# Patient Record
Sex: MALE | Race: WHITE | NOT HISPANIC OR LATINO | Employment: OTHER | URBAN - METROPOLITAN AREA
[De-identification: names, ages, dates, MRNs, and addresses within clinical notes are randomized per-mention and may not be internally consistent; named-entity substitution may affect disease eponyms.]

---

## 2023-03-21 LAB
BASOPHILS (10*3/UL) IN BLOOD BY AUTOMATED COUNT: NORMAL
BASOPHILS/100 LEUKOCYTES IN BLOOD BY AUTOMATED COUNT: NORMAL
EOSINOPHILS (10*3/UL) IN BLOOD BY AUTOMATED COUNT: NORMAL
EOSINOPHILS/100 LEUKOCYTES IN BLOOD BY AUTOMATED COUNT: NORMAL
ERYTHROCYTE DISTRIBUTION WIDTH (RATIO) BY AUTOMATED COUNT: NORMAL
ERYTHROCYTE MEAN CORPUSCULAR HEMOGLOBIN CONCENTRATION (G/DL) BY AUTOMATED: NORMAL
ERYTHROCYTE MEAN CORPUSCULAR VOLUME (FL) BY AUTOMATED COUNT: NORMAL
ERYTHROCYTES (10*6/UL) IN BLOOD BY AUTOMATED COUNT: NORMAL
HEMATOCRIT (%) IN BLOOD BY AUTOMATED COUNT: NORMAL
HEMOGLOBIN (G/DL) IN BLOOD: NORMAL
IMMATURE GRANULOCYTES/100 LEUKOCYTES IN BLOOD BY AUTOMATED COUNT: NORMAL
LEUKOCYTES (10*3/UL) IN BLOOD BY AUTOMATED COUNT: NORMAL
LYMPHOCYTES (10*3/UL) IN BLOOD BY AUTOMATED COUNT: NORMAL
LYMPHOCYTES/100 LEUKOCYTES IN BLOOD BY AUTOMATED COUNT: NORMAL
MANUAL DIFFERENTIAL Y/N: NORMAL
MONOCYTES (10*3/UL) IN BLOOD BY AUTOMATED COUNT: NORMAL
MONOCYTES/100 LEUKOCYTES IN BLOOD BY AUTOMATED COUNT: NORMAL
NEUTROPHILS (10*3/UL) IN BLOOD BY AUTOMATED COUNT: NORMAL
NEUTROPHILS/100 LEUKOCYTES IN BLOOD BY AUTOMATED COUNT: NORMAL
NRBC (PER 100 WBCS) BY AUTOMATED COUNT: NORMAL
PLATELETS (10*3/UL) IN BLOOD AUTOMATED COUNT: NORMAL

## 2023-08-14 ENCOUNTER — HOSPITAL ENCOUNTER (OUTPATIENT)
Dept: DATA CONVERSION | Facility: HOSPITAL | Age: 88
Discharge: HOME | End: 2023-08-14

## 2023-08-14 DIAGNOSIS — E11.9 TYPE 2 DIABETES MELLITUS WITHOUT COMPLICATIONS (MULTI): ICD-10-CM

## 2023-08-14 DIAGNOSIS — D58.9 HEREDITARY HEMOLYTIC ANEMIA, UNSPECIFIED (CMS-HCC): ICD-10-CM

## 2023-08-14 DIAGNOSIS — I10 ESSENTIAL (PRIMARY) HYPERTENSION: ICD-10-CM

## 2023-08-14 LAB
ALBUMIN SERPL-MCNC: 4.3 GM/DL (ref 3.5–5)
ALBUMIN/GLOB SERPL: 2 RATIO (ref 1.5–3)
ALP BLD-CCNC: 69 U/L (ref 35–125)
ALT SERPL-CCNC: 14 U/L (ref 5–40)
ANION GAP SERPL CALCULATED.3IONS-SCNC: 12 MMOL/L (ref 0–19)
AST SERPL-CCNC: 14 U/L (ref 5–40)
BILIRUB SERPL-MCNC: 0.6 MG/DL (ref 0.1–1.2)
BUN SERPL-MCNC: 16 MG/DL (ref 8–25)
BUN/CREAT SERPL: 16 RATIO (ref 8–21)
CALCIUM SERPL-MCNC: 9.4 MG/DL (ref 8.5–10.4)
CHLORIDE SERPL-SCNC: 102 MMOL/L (ref 97–107)
CO2 SERPL-SCNC: 28 MMOL/L (ref 24–31)
CREAT SERPL-MCNC: 1 MG/DL (ref 0.4–1.6)
DEPRECATED RDW RBC AUTO: 47.6 FL (ref 37–54)
ERYTHROCYTE [DISTWIDTH] IN BLOOD BY AUTOMATED COUNT: 13.9 % (ref 11.7–15)
GFR SERPL CREATININE-BSD FRML MDRD: 73 ML/MIN/1.73 M2
GLOBULIN SER-MCNC: 2.1 G/DL (ref 1.9–3.7)
GLUCOSE SERPL-MCNC: 103 MG/DL (ref 65–99)
HBA1C MFR BLD: 6.4 % (ref 4–6)
HCT VFR BLD AUTO: 48.7 % (ref 41–50)
HGB BLD-MCNC: 16.7 GM/DL (ref 13.5–16.5)
MCH RBC QN AUTO: 32.1 PG (ref 26–34)
MCHC RBC AUTO-ENTMCNC: 34.3 % (ref 31–37)
MCV RBC AUTO: 93.5 FL (ref 80–100)
NRBC BLD-RTO: 0 /100 WBC
PLATELET # BLD AUTO: 224 K/UL (ref 150–450)
PMV BLD AUTO: 10.7 CU (ref 7–12.6)
POTASSIUM SERPL-SCNC: 4.4 MMOL/L (ref 3.4–5.1)
PROT SERPL-MCNC: 6.4 G/DL (ref 5.9–7.9)
RBC # BLD AUTO: 5.21 M/UL (ref 4.5–5.5)
SODIUM SERPL-SCNC: 142 MMOL/L (ref 133–145)
WBC # BLD AUTO: 10 K/UL (ref 4.5–11)

## 2023-09-14 PROBLEM — R26.9 GAIT ABNORMALITY: Status: ACTIVE | Noted: 2023-09-14

## 2023-09-14 PROBLEM — M54.40 BACK PAIN OF LUMBAR REGION WITH SCIATICA: Status: ACTIVE | Noted: 2023-09-14

## 2023-09-14 PROBLEM — R11.2 NAUSEA & VOMITING: Status: ACTIVE | Noted: 2023-09-14

## 2023-09-14 PROBLEM — G62.9 NEUROPATHY: Status: ACTIVE | Noted: 2023-09-14

## 2023-09-14 PROBLEM — R31.9 HEMATURIA SYNDROME: Status: ACTIVE | Noted: 2023-09-14

## 2023-09-14 PROBLEM — M10.9 GOUT: Status: ACTIVE | Noted: 2023-09-14

## 2023-09-14 PROBLEM — M47.816 LUMBAR SPONDYLOSIS: Status: ACTIVE | Noted: 2023-09-14

## 2023-09-14 PROBLEM — E11.9 TYPE 2 DIABETES MELLITUS WITHOUT COMPLICATION, WITHOUT LONG-TERM CURRENT USE OF INSULIN (MULTI): Status: ACTIVE | Noted: 2023-09-14

## 2023-09-14 PROBLEM — J11.1 INFLUENZA-LIKE ILLNESS: Status: ACTIVE | Noted: 2023-09-14

## 2023-09-14 PROBLEM — N20.0 KIDNEY STONE: Status: ACTIVE | Noted: 2023-09-14

## 2023-09-14 PROBLEM — R53.82 CHRONIC FATIGUE: Status: ACTIVE | Noted: 2023-09-14

## 2023-09-14 PROBLEM — F41.9 ANXIETY: Status: ACTIVE | Noted: 2023-09-14

## 2023-09-14 PROBLEM — G89.29 OTHER CHRONIC PAIN: Status: ACTIVE | Noted: 2023-09-14

## 2023-09-14 PROBLEM — D59.10 AUTOIMMUNE HEMOLYTIC ANEMIA (MULTI): Status: ACTIVE | Noted: 2023-09-14

## 2023-09-14 PROBLEM — N40.0 BENIGN PROSTATIC HYPERPLASIA: Status: ACTIVE | Noted: 2023-09-14

## 2023-09-14 PROBLEM — I10 HYPERTENSION: Status: ACTIVE | Noted: 2023-09-14

## 2023-09-14 PROBLEM — K21.9 GASTROESOPHAGEAL REFLUX DISEASE WITHOUT ESOPHAGITIS: Status: ACTIVE | Noted: 2023-09-14

## 2023-09-14 PROBLEM — R10.9 ABDOMINAL PAIN: Status: ACTIVE | Noted: 2023-09-14

## 2023-09-14 PROBLEM — E78.5 HYPERLIPIDEMIA: Status: ACTIVE | Noted: 2023-09-14

## 2023-09-14 PROBLEM — N32.81 OVERACTIVE BLADDER: Status: ACTIVE | Noted: 2023-09-14

## 2023-09-14 PROBLEM — F32.A DEPRESSION: Status: ACTIVE | Noted: 2023-09-14

## 2023-09-14 PROBLEM — M19.90 OSTEOARTHRITIS: Status: ACTIVE | Noted: 2023-09-14

## 2023-09-14 RX ORDER — AMLODIPINE BESYLATE 5 MG/1
1 TABLET ORAL DAILY
COMMUNITY
Start: 2015-09-09

## 2023-09-14 RX ORDER — LISINOPRIL 20 MG/1
1 TABLET ORAL 2 TIMES DAILY
COMMUNITY
End: 2024-04-15

## 2023-09-14 RX ORDER — TRAZODONE HYDROCHLORIDE 50 MG/1
1 TABLET ORAL NIGHTLY
COMMUNITY
End: 2024-04-15

## 2023-09-14 RX ORDER — MAGNESIUM OXIDE/MAG AA CHELATE 300 MG
1 CAPSULE ORAL DAILY
COMMUNITY

## 2023-09-14 RX ORDER — ALLOPURINOL 100 MG/1
2 TABLET ORAL DAILY
COMMUNITY
End: 2023-12-15 | Stop reason: ALTCHOICE

## 2023-09-14 RX ORDER — FINASTERIDE 5 MG/1
1 TABLET, FILM COATED ORAL DAILY
COMMUNITY
Start: 2023-09-07 | End: 2024-09-01

## 2023-09-14 RX ORDER — ZINC GLUCONATE 50 MG
1 TABLET ORAL DAILY
COMMUNITY

## 2023-09-14 RX ORDER — ASCORBIC ACID 500 MG
TABLET ORAL
COMMUNITY

## 2023-12-01 ENCOUNTER — OFFICE VISIT (OUTPATIENT)
Dept: PRIMARY CARE | Facility: CLINIC | Age: 88
End: 2023-12-01
Payer: MEDICARE

## 2023-12-01 VITALS
HEART RATE: 97 BPM | WEIGHT: 197 LBS | OXYGEN SATURATION: 95 % | DIASTOLIC BLOOD PRESSURE: 74 MMHG | BODY MASS INDEX: 28.47 KG/M2 | SYSTOLIC BLOOD PRESSURE: 138 MMHG | TEMPERATURE: 98.4 F

## 2023-12-01 DIAGNOSIS — R21 RASH AND NONSPECIFIC SKIN ERUPTION: Primary | ICD-10-CM

## 2023-12-01 PROCEDURE — 3078F DIAST BP <80 MM HG: CPT | Performed by: INTERNAL MEDICINE

## 2023-12-01 PROCEDURE — 1125F AMNT PAIN NOTED PAIN PRSNT: CPT | Performed by: INTERNAL MEDICINE

## 2023-12-01 PROCEDURE — 1036F TOBACCO NON-USER: CPT | Performed by: INTERNAL MEDICINE

## 2023-12-01 PROCEDURE — 3075F SYST BP GE 130 - 139MM HG: CPT | Performed by: INTERNAL MEDICINE

## 2023-12-01 PROCEDURE — 99214 OFFICE O/P EST MOD 30 MIN: CPT | Mod: 25 | Performed by: INTERNAL MEDICINE

## 2023-12-01 PROCEDURE — 1159F MED LIST DOCD IN RCRD: CPT | Performed by: INTERNAL MEDICINE

## 2023-12-01 PROCEDURE — 99214 OFFICE O/P EST MOD 30 MIN: CPT | Performed by: INTERNAL MEDICINE

## 2023-12-01 RX ORDER — METHYLPREDNISOLONE 4 MG/1
TABLET ORAL
Qty: 21 TABLET | Refills: 0 | Status: SHIPPED | OUTPATIENT
Start: 2023-12-01 | End: 2023-12-08

## 2023-12-01 ASSESSMENT — ENCOUNTER SYMPTOMS
COLOR CHANGE: 1
FEVER: 0
OCCASIONAL FEELINGS OF UNSTEADINESS: 0
CHILLS: 0
FATIGUE: 0
MYALGIAS: 0
JOINT SWELLING: 0
GASTROINTESTINAL NEGATIVE: 1
DEPRESSION: 0
LOSS OF SENSATION IN FEET: 0

## 2023-12-01 ASSESSMENT — PATIENT HEALTH QUESTIONNAIRE - PHQ9
1. LITTLE INTEREST OR PLEASURE IN DOING THINGS: NOT AT ALL
SUM OF ALL RESPONSES TO PHQ9 QUESTIONS 1 AND 2: 0
2. FEELING DOWN, DEPRESSED OR HOPELESS: NOT AT ALL

## 2023-12-01 ASSESSMENT — PAIN SCALES - GENERAL: PAINLEVEL: 4

## 2023-12-01 NOTE — PATIENT INSTRUCTIONS
It was nice meeting you Mr. Motta. I'm sorry about your rash. I will order you a steroid to help with the itching. Please avoid scratching as much as you can and make sure your hands are clean. If your symptoms worsen or if you notice the rash becomes open and looks bad or or you develop fevers, rash, and or chills please contact the office immediately.

## 2023-12-01 NOTE — PROGRESS NOTES
[facility]: [facility]  PROGRESS NOTE      David Motta is a 88 y.o. male that is presenting today for Establish Care (Complains rash on thighs all the way down legs, feet, and back. States left knee was inflamed yesterday.).    Assessment/Plan   I reviewed his most recent blood work and medications. This appears to be a nonspecific rash with unknown etiology. He has no other significant S/S aside from the rash and pruritis. I will treat him with a steroid dose janis and we sill see if this helps. Diagnoses and all orders for this visit:  Rash and nonspecific skin eruption  -     methylPREDNISolone (Medrol Dospak) 4 mg tablets; Take as directed on package.    Subjective   Mr. Pickering presents to the office today for concerns of a rash. His daughter is accompanying him. He reports onset of rash a few weeks ago to his RLE. He notes that the rash has spread up his leg, to his other leg and onto his back.  He reports severe itching. Denies pain. Hot water worsens the sensation and alcohol exacerbates the redness and pruritus.  He has tried benadryl which provided no relief.  He denies new detergents, soaps, and or new meds.      Review of Systems   Constitutional:  Negative for chills, fatigue and fever.   Gastrointestinal: Negative.    Musculoskeletal:  Negative for joint swelling and myalgias.   Skin:  Positive for color change and rash.        Significant generalized pruritus       Objective   Vitals:    12/01/23 1038   BP: 138/74   Pulse: 97   Temp: 36.9 °C (98.4 °F)   SpO2: 95%      Body mass index is 28.47 kg/m².  Physical Exam  Constitutional:       General: He is not in acute distress.     Appearance: He is not toxic-appearing.   Cardiovascular:      Rate and Rhythm: Regular rhythm.   Skin:     Findings: Rash present. No ecchymosis. Rash is macular. Rash is not crusting, nodular, purpuric or vesicular.             Comments: Pruritic macular rash to neck, upper back, bilateral knees  "and lower legs. Notes excoriation lines to upper back. No signs of secondary infection.        Diagnostic Results   Lab Results   Component Value Date    GLUCOSE 103 (H) 08/14/2023    CALCIUM 9.4 08/14/2023     08/14/2023    K 4.4 08/14/2023    CO2 28 08/14/2023     08/14/2023    BUN 16 08/14/2023    CREATININE 1.0 08/14/2023     Lab Results   Component Value Date    ALT 14 08/14/2023    AST 14 08/14/2023    ALKPHOS 69 08/14/2023    BILITOT 0.6 08/14/2023     Lab Results   Component Value Date    WBC 10.0 08/14/2023    HGB 16.7 (H) 08/14/2023    HCT 48.7 08/14/2023    MCV 93.5 08/14/2023     08/14/2023     Lab Results   Component Value Date    CHOL 191 04/15/2022    CHOL 205 (H) 03/29/2021    CHOL 216 (H) 09/01/2020     Lab Results   Component Value Date    HDL 40 (L) 04/15/2022    HDL 42 03/29/2021    HDL 34 (L) 09/01/2020     Lab Results   Component Value Date    LDLCALC 130 04/15/2022    LDLCALC 137 (H) 03/29/2021    LDLCALC 155 (H) 09/01/2020     Lab Results   Component Value Date    TRIG 104 04/15/2022    TRIG 132 03/29/2021    TRIG 136 09/01/2020     No components found for: \"CHOLHDL\"  Lab Results   Component Value Date    HGBA1C 6.4 (H) 08/14/2023     Other labs not included in the list above were reviewed either before or during this encounter.    History    History reviewed. No pertinent past medical history.  History reviewed. No pertinent surgical history.  Family History   Problem Relation Name Age of Onset    No Known Problems Mother      No Known Problems Father      Other (High Blood Pressure) Other Family History     Cancer Other Family History      Social History     Socioeconomic History    Marital status:      Spouse name: Not on file    Number of children: Not on file    Years of education: Not on file    Highest education level: Not on file   Occupational History    Not on file   Tobacco Use    Smoking status: Former     Types: Cigarettes    Smokeless tobacco: Never "   Substance and Sexual Activity    Alcohol use: Yes    Drug use: Never    Sexual activity: Not on file   Other Topics Concern    Not on file   Social History Narrative    Not on file     Social Determinants of Health     Financial Resource Strain: Not on file   Food Insecurity: Not on file   Transportation Needs: Not on file   Physical Activity: Not on file   Stress: Not on file   Social Connections: Not on file   Intimate Partner Violence: Not on file   Housing Stability: Not on file     Allergies   Allergen Reactions    Sulfa (Sulfonamide Antibiotics) Other    Levofloxacin Hives    Nitrofurantoin Monohyd/M-Cryst Itching    Atorvastatin Other    Pravastatin Sodium Myalgia     Current Outpatient Medications on File Prior to Visit   Medication Sig Dispense Refill    allopurinol (Zyloprim) 100 mg tablet Take 2 tablets (200 mg) by mouth once daily.      amLODIPine (Norvasc) 5 mg tablet Take 1 tablet (5 mg) by mouth once daily.      ascorbic acid (Vitamin C) 500 mg tablet Take by mouth.      cholecalciferol, vitamin D3, (VITAMIN D3 ORAL) Take 1 tablet by mouth once daily.      finasteride (Proscar) 5 mg tablet Take 1 tablet (5 mg) by mouth once daily.      lisinopril 20 mg tablet Take 1 tablet (20 mg) by mouth 2 times a day.      magnesium oxide-Mg AA chelate (Magnesium, oxide/AA chelate,) 300 mg capsule Take 1 capsule (300 mg) by mouth once daily.      tamsulosin HCl (TAMSULOSIN ORAL) Take by mouth.      traZODone (Desyrel) 50 mg tablet Take 1 tablet (50 mg) by mouth once daily at bedtime.      zinc gluconate 50 mg tablet Take 1 tablet (50 mg) by mouth once daily.       No current facility-administered medications on file prior to visit.     Immunization History   Administered Date(s) Administered    Flu vaccine, quadrivalent, high-dose, preservative free, age 65y+ (FLUZONE) 12/03/2021, 10/19/2022    Moderna SARS-CoV-2 Vaccination 04/16/2021, 05/13/2021    Pneumococcal conjugate vaccine, 13-valent (PREVNAR 13)  06/06/2016    Td vaccine, age 7 years and older (TDVAX) 07/03/2003, 04/23/2013    Tdap vaccine, age 7 year and older (BOOSTRIX) 02/03/2016    Zoster, live 05/06/2014     Patient's medical history was reviewed and updated either before or during this encounter.       Marlee Juarez, APRN-CNP

## 2023-12-13 PROBLEM — R31.9 HEMATURIA SYNDROME: Status: RESOLVED | Noted: 2023-09-14 | Resolved: 2023-12-13

## 2023-12-13 PROBLEM — R10.9 ABDOMINAL PAIN: Status: RESOLVED | Noted: 2023-09-14 | Resolved: 2023-12-13

## 2023-12-13 PROBLEM — G89.29 OTHER CHRONIC PAIN: Status: RESOLVED | Noted: 2023-09-14 | Resolved: 2023-12-13

## 2023-12-13 PROBLEM — R11.2 NAUSEA & VOMITING: Status: RESOLVED | Noted: 2023-09-14 | Resolved: 2023-12-13

## 2023-12-13 PROBLEM — J11.1 INFLUENZA-LIKE ILLNESS: Status: RESOLVED | Noted: 2023-09-14 | Resolved: 2023-12-13

## 2023-12-13 PROBLEM — R53.82 CHRONIC FATIGUE: Status: RESOLVED | Noted: 2023-09-14 | Resolved: 2023-12-13

## 2023-12-13 PROBLEM — R26.9 GAIT ABNORMALITY: Status: RESOLVED | Noted: 2023-09-14 | Resolved: 2023-12-13

## 2023-12-13 NOTE — PROGRESS NOTES
[facility]: [facility]  PROGRESS NOTE      David Motta is a 88 y.o. male that is presenting today for No chief complaint on file..    Assessment/Plan   Diagnoses and all orders for this visit:  Primary hypertension  Mixed hyperlipidemia  Lumbar spondylosis  Neuropathy  Osteoarthritis, unspecified osteoarthritis type, unspecified site  We reviewed his current situation.  On the good side his blood pressure is under good control his sugars are under good control and his arthritis pains are generally well-controlled except for some discomfort along his knee on the bedside he has this diffuse papular rash that we really do not fully understand at the moment.  I was a bit worried with his history of autoimmune hemolytic anemia that perhaps it could be a manifestation of such however there is no evidence of anemia at the moment.  He did have some response to steroids.  I also wonder whether could be a drug eruption.  The overall plan is ongoing to give him a slow taper of prednisone and we are going to withdraw his allopurinol.  If this rash does not respond to steroids or returns shortly after the steroids are stopped he understands he will need to see dermatology.  There really is no need to change any of his other medications.  Subjective   Pt sees me for follow up care .. Pt has had a rash on the legs ; he had it a few weeks ago - improved with medrol - once stopped it it came back - no oral lesions - no other issues identified - feels good.      Review of Systems   Constitutional: Negative.    Respiratory: Negative.     Cardiovascular: Negative.    Gastrointestinal: Negative.    Genitourinary: Negative.       Objective   There were no vitals filed for this visit.   There is no height or weight on file to calculate BMI.  Physical Exam  Constitutional:       Appearance: Normal appearance.   HENT:      Head: Atraumatic.      Mouth/Throat:      Mouth: Mucous membranes are dry.  "  Cardiovascular:      Rate and Rhythm: Normal rate and regular rhythm.      Pulses: Normal pulses.      Heart sounds: Normal heart sounds.   Pulmonary:      Effort: Pulmonary effort is normal.      Breath sounds: Normal breath sounds.   Abdominal:      General: Abdomen is flat. Bowel sounds are normal.      Palpations: Abdomen is soft.   Musculoskeletal:         General: Normal range of motion.      Cervical back: Normal range of motion and neck supple.   Skin:     Comments: Pt with diffuse papular rash - lance on the lower extremity; and slightly on the shoulders       Diagnostic Results   Lab Results   Component Value Date    GLUCOSE 103 (H) 08/14/2023    CALCIUM 9.4 08/14/2023     08/14/2023    K 4.4 08/14/2023    CO2 28 08/14/2023     08/14/2023    BUN 16 08/14/2023    CREATININE 1.0 08/14/2023     Lab Results   Component Value Date    ALT 14 08/14/2023    AST 14 08/14/2023    ALKPHOS 69 08/14/2023    BILITOT 0.6 08/14/2023     Lab Results   Component Value Date    WBC 10.0 08/14/2023    HGB 16.7 (H) 08/14/2023    HCT 48.7 08/14/2023    MCV 93.5 08/14/2023     08/14/2023     Lab Results   Component Value Date    CHOL 191 04/15/2022    CHOL 205 (H) 03/29/2021    CHOL 216 (H) 09/01/2020     Lab Results   Component Value Date    HDL 40 (L) 04/15/2022    HDL 42 03/29/2021    HDL 34 (L) 09/01/2020     Lab Results   Component Value Date    LDLCALC 130 04/15/2022    LDLCALC 137 (H) 03/29/2021    LDLCALC 155 (H) 09/01/2020     Lab Results   Component Value Date    TRIG 104 04/15/2022    TRIG 132 03/29/2021    TRIG 136 09/01/2020     No components found for: \"CHOLHDL\"  Lab Results   Component Value Date    HGBA1C 6.4 (H) 08/14/2023     Other labs not included in the list above were reviewed either before or during this encounter.    History    No past medical history on file.  No past surgical history on file.  Family History   Problem Relation Name Age of Onset    No Known Problems Mother      No Known " Problems Father      Other (High Blood Pressure) Other Family History     Cancer Other Family History      Social History     Socioeconomic History    Marital status:      Spouse name: Not on file    Number of children: Not on file    Years of education: Not on file    Highest education level: Not on file   Occupational History    Not on file   Tobacco Use    Smoking status: Former     Types: Cigarettes    Smokeless tobacco: Never   Substance and Sexual Activity    Alcohol use: Yes    Drug use: Never    Sexual activity: Not on file   Other Topics Concern    Not on file   Social History Narrative    Not on file     Social Determinants of Health     Financial Resource Strain: Not on file   Food Insecurity: Not on file   Transportation Needs: Not on file   Physical Activity: Not on file   Stress: Not on file   Social Connections: Not on file   Intimate Partner Violence: Not on file   Housing Stability: Not on file     Allergies   Allergen Reactions    Sulfa (Sulfonamide Antibiotics) Other    Levofloxacin Hives    Nitrofurantoin Monohyd/M-Cryst Itching    Atorvastatin Other    Pravastatin Sodium Myalgia     Current Outpatient Medications on File Prior to Visit   Medication Sig Dispense Refill    allopurinol (Zyloprim) 100 mg tablet Take 2 tablets (200 mg) by mouth once daily.      amLODIPine (Norvasc) 5 mg tablet Take 1 tablet (5 mg) by mouth once daily.      ascorbic acid (Vitamin C) 500 mg tablet Take by mouth.      cholecalciferol, vitamin D3, (VITAMIN D3 ORAL) Take 1 tablet by mouth once daily.      finasteride (Proscar) 5 mg tablet Take 1 tablet (5 mg) by mouth once daily.      lisinopril 20 mg tablet Take 1 tablet (20 mg) by mouth 2 times a day.      magnesium oxide-Mg AA chelate (Magnesium, oxide/AA chelate,) 300 mg capsule Take 1 capsule (300 mg) by mouth once daily.      [] methylPREDNISolone (Medrol Dospak) 4 mg tablets Take as directed on package. 21 tablet 0    tamsulosin HCl (TAMSULOSIN ORAL)  Take by mouth.      traZODone (Desyrel) 50 mg tablet Take 1 tablet (50 mg) by mouth once daily at bedtime.      zinc gluconate 50 mg tablet Take 1 tablet (50 mg) by mouth once daily.       No current facility-administered medications on file prior to visit.     Immunization History   Administered Date(s) Administered    Flu vaccine, quadrivalent, high-dose, preservative free, age 65y+ (FLUZONE) 12/03/2021, 10/19/2022    Moderna SARS-CoV-2 Vaccination 04/16/2021, 05/13/2021    Pneumococcal conjugate vaccine, 13-valent (PREVNAR 13) 06/06/2016    Td vaccine, age 7 years and older (TDVAX) 07/03/2003, 04/23/2013    Tdap vaccine, age 7 year and older (BOOSTRIX) 02/03/2016    Zoster, live 05/06/2014     Patient's medical history was reviewed and updated either before or during this encounter.       Mohan Lock MD

## 2023-12-14 ENCOUNTER — LAB (OUTPATIENT)
Dept: LAB | Facility: LAB | Age: 88
End: 2023-12-14
Payer: MEDICARE

## 2023-12-14 DIAGNOSIS — E11.9 TYPE 2 DIABETES MELLITUS WITHOUT COMPLICATIONS (MULTI): ICD-10-CM

## 2023-12-14 DIAGNOSIS — I10 ESSENTIAL (PRIMARY) HYPERTENSION: Primary | ICD-10-CM

## 2023-12-14 DIAGNOSIS — M10.9 GOUT, UNSPECIFIED: ICD-10-CM

## 2023-12-14 LAB
ALBUMIN SERPL-MCNC: 4.5 G/DL (ref 3.5–5)
ALP BLD-CCNC: 73 U/L (ref 35–125)
ALT SERPL-CCNC: 18 U/L (ref 5–40)
ANION GAP SERPL CALC-SCNC: 14 MMOL/L
AST SERPL-CCNC: 12 U/L (ref 5–40)
BILIRUB SERPL-MCNC: 0.7 MG/DL (ref 0.1–1.2)
BUN SERPL-MCNC: 20 MG/DL (ref 8–25)
CALCIUM SERPL-MCNC: 9.3 MG/DL (ref 8.5–10.4)
CHLORIDE SERPL-SCNC: 101 MMOL/L (ref 97–107)
CO2 SERPL-SCNC: 28 MMOL/L (ref 24–31)
CREAT SERPL-MCNC: 1.2 MG/DL (ref 0.4–1.6)
ERYTHROCYTE [DISTWIDTH] IN BLOOD BY AUTOMATED COUNT: 14 % (ref 11.5–14.5)
EST. AVERAGE GLUCOSE BLD GHB EST-MCNC: 100 MG/DL
GFR SERPL CREATININE-BSD FRML MDRD: 59 ML/MIN/1.73M*2
GLUCOSE SERPL-MCNC: 155 MG/DL (ref 65–99)
HBA1C MFR BLD: 5.1 %
HCT VFR BLD AUTO: 52.2 % (ref 41–52)
HGB BLD-MCNC: 17.1 G/DL (ref 13.5–17.5)
MCH RBC QN AUTO: 32.6 PG (ref 26–34)
MCHC RBC AUTO-ENTMCNC: 32.8 G/DL (ref 32–36)
MCV RBC AUTO: 100 FL (ref 80–100)
NRBC BLD-RTO: 0 /100 WBCS (ref 0–0)
PLATELET # BLD AUTO: 222 X10*3/UL (ref 150–450)
POTASSIUM SERPL-SCNC: 4.5 MMOL/L (ref 3.4–5.1)
PROT SERPL-MCNC: 6.3 G/DL (ref 5.9–7.9)
RBC # BLD AUTO: 5.24 X10*6/UL (ref 4.5–5.9)
SODIUM SERPL-SCNC: 143 MMOL/L (ref 133–145)
URATE SERPL-MCNC: 6 MG/DL (ref 3.6–7.7)
WBC # BLD AUTO: 10.3 X10*3/UL (ref 4.4–11.3)

## 2023-12-14 PROCEDURE — 80053 COMPREHEN METABOLIC PANEL: CPT

## 2023-12-14 PROCEDURE — 84550 ASSAY OF BLOOD/URIC ACID: CPT

## 2023-12-14 PROCEDURE — 83036 HEMOGLOBIN GLYCOSYLATED A1C: CPT

## 2023-12-14 PROCEDURE — 85027 COMPLETE CBC AUTOMATED: CPT

## 2023-12-14 PROCEDURE — 36415 COLL VENOUS BLD VENIPUNCTURE: CPT

## 2023-12-15 ENCOUNTER — OFFICE VISIT (OUTPATIENT)
Dept: PRIMARY CARE | Facility: CLINIC | Age: 88
End: 2023-12-15
Payer: MEDICARE

## 2023-12-15 VITALS
DIASTOLIC BLOOD PRESSURE: 60 MMHG | TEMPERATURE: 97.6 F | HEIGHT: 70 IN | SYSTOLIC BLOOD PRESSURE: 112 MMHG | BODY MASS INDEX: 27.77 KG/M2 | HEART RATE: 97 BPM | WEIGHT: 194 LBS | OXYGEN SATURATION: 95 %

## 2023-12-15 DIAGNOSIS — Z01.89 ENCOUNTER FOR ROUTINE LABORATORY TESTING: ICD-10-CM

## 2023-12-15 DIAGNOSIS — M47.816 LUMBAR SPONDYLOSIS: ICD-10-CM

## 2023-12-15 DIAGNOSIS — I10 PRIMARY HYPERTENSION: Primary | ICD-10-CM

## 2023-12-15 DIAGNOSIS — M10.9 GOUT, UNSPECIFIED CAUSE, UNSPECIFIED CHRONICITY, UNSPECIFIED SITE: ICD-10-CM

## 2023-12-15 DIAGNOSIS — R73.9 HYPERGLYCEMIA: ICD-10-CM

## 2023-12-15 DIAGNOSIS — L30.9 DERMATITIS: ICD-10-CM

## 2023-12-15 DIAGNOSIS — M19.90 OSTEOARTHRITIS, UNSPECIFIED OSTEOARTHRITIS TYPE, UNSPECIFIED SITE: ICD-10-CM

## 2023-12-15 DIAGNOSIS — E78.2 MIXED HYPERLIPIDEMIA: ICD-10-CM

## 2023-12-15 DIAGNOSIS — N32.81 OVERACTIVE BLADDER: ICD-10-CM

## 2023-12-15 DIAGNOSIS — E11.9 TYPE 2 DIABETES MELLITUS WITHOUT COMPLICATION, WITHOUT LONG-TERM CURRENT USE OF INSULIN (MULTI): ICD-10-CM

## 2023-12-15 DIAGNOSIS — G62.9 NEUROPATHY: ICD-10-CM

## 2023-12-15 PROCEDURE — 1159F MED LIST DOCD IN RCRD: CPT | Performed by: INTERNAL MEDICINE

## 2023-12-15 PROCEDURE — 1036F TOBACCO NON-USER: CPT | Performed by: INTERNAL MEDICINE

## 2023-12-15 PROCEDURE — 99214 OFFICE O/P EST MOD 30 MIN: CPT | Performed by: INTERNAL MEDICINE

## 2023-12-15 PROCEDURE — 3074F SYST BP LT 130 MM HG: CPT | Performed by: INTERNAL MEDICINE

## 2023-12-15 PROCEDURE — 1126F AMNT PAIN NOTED NONE PRSNT: CPT | Performed by: INTERNAL MEDICINE

## 2023-12-15 PROCEDURE — 3078F DIAST BP <80 MM HG: CPT | Performed by: INTERNAL MEDICINE

## 2023-12-15 RX ORDER — PREDNISONE 10 MG/1
TABLET ORAL
Qty: 40 TABLET | Refills: 0 | Status: SHIPPED | OUTPATIENT
Start: 2023-12-15 | End: 2024-12-09

## 2023-12-15 ASSESSMENT — ENCOUNTER SYMPTOMS
CARDIOVASCULAR NEGATIVE: 1
RESPIRATORY NEGATIVE: 1
GASTROINTESTINAL NEGATIVE: 1
CONSTITUTIONAL NEGATIVE: 1

## 2023-12-15 ASSESSMENT — PAIN SCALES - GENERAL: PAINLEVEL: 0-NO PAIN

## 2023-12-15 NOTE — PATIENT INSTRUCTIONS
I am sorry to see this bad rash on your legs.  I am glad to see that all your other medical problems seem to be in good shape right now.  I think the plan right now is only going to give you prednisone 10 mg tablets I would like you to take 4-day for 4 days 3 a day for 4 days 2 a day for 4 days and then 1 daily for 4 days.  If this does not respond to the steroids or if it recurs shortly after the steroids please call me and I will get you a referral to a dermatology practice.    The only other change I think will make today is we will stop your allopurinol you might not need this medicine now and perhaps it is having something to do with your nonspecific rash.  I will plan on seeing you back in another 3 months

## 2024-01-02 ENCOUNTER — TELEPHONE (OUTPATIENT)
Dept: PRIMARY CARE | Facility: CLINIC | Age: 89
End: 2024-01-02
Payer: MEDICARE

## 2024-01-02 DIAGNOSIS — R21 RASH OF UNKNOWN ETIOLOGY: ICD-10-CM

## 2024-01-02 NOTE — TELEPHONE ENCOUNTER
Pt called office c/o unresolved rash. States that rash has improved slightly with steroids, but has not gone away and is still quite itchy. Requesting referral for derm.

## 2024-02-05 ENCOUNTER — LAB (OUTPATIENT)
Dept: LAB | Facility: CLINIC | Age: 89
End: 2024-02-05
Payer: MEDICARE

## 2024-02-05 DIAGNOSIS — D59.10 AUTOIMMUNE HEMOLYTIC ANEMIA (MULTI): Primary | ICD-10-CM

## 2024-02-05 DIAGNOSIS — D59.10 AUTOIMMUNE HEMOLYTIC ANEMIA (MULTI): ICD-10-CM

## 2024-02-05 LAB
ALBUMIN SERPL BCP-MCNC: 4.3 G/DL (ref 3.4–5)
ALP SERPL-CCNC: 58 U/L (ref 33–136)
ALT SERPL W P-5'-P-CCNC: 13 U/L (ref 10–52)
ANION GAP SERPL CALC-SCNC: 15 MMOL/L (ref 10–20)
AST SERPL W P-5'-P-CCNC: 12 U/L (ref 9–39)
BASOPHILS # BLD AUTO: 0.05 X10*3/UL (ref 0–0.1)
BASOPHILS NFR BLD AUTO: 0.4 %
BILIRUB SERPL-MCNC: 0.7 MG/DL (ref 0–1.2)
BUN SERPL-MCNC: 19 MG/DL (ref 6–23)
CALCIUM SERPL-MCNC: 9 MG/DL (ref 8.6–10.3)
CHLORIDE SERPL-SCNC: 102 MMOL/L (ref 98–107)
CO2 SERPL-SCNC: 27 MMOL/L (ref 21–32)
CREAT SERPL-MCNC: 0.95 MG/DL (ref 0.5–1.3)
EGFRCR SERPLBLD CKD-EPI 2021: 78 ML/MIN/1.73M*2
EOSINOPHIL # BLD AUTO: 0.2 X10*3/UL (ref 0–0.4)
EOSINOPHIL NFR BLD AUTO: 1.6 %
ERYTHROCYTE [DISTWIDTH] IN BLOOD BY AUTOMATED COUNT: 12.7 % (ref 11.5–14.5)
GLUCOSE SERPL-MCNC: 115 MG/DL (ref 74–99)
HCT VFR BLD AUTO: 50.1 % (ref 41–52)
HGB BLD-MCNC: 16.9 G/DL (ref 13.5–17.5)
IMM GRANULOCYTES # BLD AUTO: 0.08 X10*3/UL (ref 0–0.5)
IMM GRANULOCYTES NFR BLD AUTO: 0.7 % (ref 0–0.9)
LYMPHOCYTES # BLD AUTO: 1.22 X10*3/UL (ref 0.8–3)
LYMPHOCYTES NFR BLD AUTO: 10 %
MCH RBC QN AUTO: 31.3 PG (ref 26–34)
MCHC RBC AUTO-ENTMCNC: 33.7 G/DL (ref 32–36)
MCV RBC AUTO: 93 FL (ref 80–100)
MONOCYTES # BLD AUTO: 0.88 X10*3/UL (ref 0.05–0.8)
MONOCYTES NFR BLD AUTO: 7.2 %
NEUTROPHILS # BLD AUTO: 9.78 X10*3/UL (ref 1.6–5.5)
NEUTROPHILS NFR BLD AUTO: 80.1 %
NRBC BLD-RTO: 0 /100 WBCS (ref 0–0)
PLATELET # BLD AUTO: 212 X10*3/UL (ref 150–450)
POTASSIUM SERPL-SCNC: 3.7 MMOL/L (ref 3.5–5.3)
PROT SERPL-MCNC: 6.3 G/DL (ref 6.4–8.2)
RBC # BLD AUTO: 5.4 X10*6/UL (ref 4.5–5.9)
SODIUM SERPL-SCNC: 140 MMOL/L (ref 136–145)
WBC # BLD AUTO: 12.2 X10*3/UL (ref 4.4–11.3)

## 2024-02-05 PROCEDURE — 85025 COMPLETE CBC W/AUTO DIFF WBC: CPT

## 2024-02-05 PROCEDURE — 36415 COLL VENOUS BLD VENIPUNCTURE: CPT

## 2024-02-05 PROCEDURE — 84075 ASSAY ALKALINE PHOSPHATASE: CPT

## 2024-02-06 ENCOUNTER — OFFICE VISIT (OUTPATIENT)
Dept: HEMATOLOGY/ONCOLOGY | Facility: CLINIC | Age: 89
End: 2024-02-06
Payer: MEDICARE

## 2024-02-06 VITALS
RESPIRATION RATE: 18 BRPM | DIASTOLIC BLOOD PRESSURE: 74 MMHG | WEIGHT: 193.67 LBS | TEMPERATURE: 97.3 F | BODY MASS INDEX: 27.98 KG/M2 | HEART RATE: 98 BPM | SYSTOLIC BLOOD PRESSURE: 158 MMHG | OXYGEN SATURATION: 94 %

## 2024-02-06 DIAGNOSIS — D59.10 AUTOIMMUNE HEMOLYTIC ANEMIA (MULTI): Primary | ICD-10-CM

## 2024-02-06 PROCEDURE — 3078F DIAST BP <80 MM HG: CPT | Performed by: NURSE PRACTITIONER

## 2024-02-06 PROCEDURE — 3077F SYST BP >= 140 MM HG: CPT | Performed by: NURSE PRACTITIONER

## 2024-02-06 PROCEDURE — 99214 OFFICE O/P EST MOD 30 MIN: CPT | Performed by: NURSE PRACTITIONER

## 2024-02-06 PROCEDURE — 1036F TOBACCO NON-USER: CPT | Performed by: NURSE PRACTITIONER

## 2024-02-06 PROCEDURE — 1159F MED LIST DOCD IN RCRD: CPT | Performed by: NURSE PRACTITIONER

## 2024-02-06 PROCEDURE — 1126F AMNT PAIN NOTED NONE PRSNT: CPT | Performed by: NURSE PRACTITIONER

## 2024-02-06 ASSESSMENT — COLUMBIA-SUICIDE SEVERITY RATING SCALE - C-SSRS
2. HAVE YOU ACTUALLY HAD ANY THOUGHTS OF KILLING YOURSELF?: NO
6. HAVE YOU EVER DONE ANYTHING, STARTED TO DO ANYTHING, OR PREPARED TO DO ANYTHING TO END YOUR LIFE?: NO
1. IN THE PAST MONTH, HAVE YOU WISHED YOU WERE DEAD OR WISHED YOU COULD GO TO SLEEP AND NOT WAKE UP?: NO

## 2024-02-06 ASSESSMENT — ENCOUNTER SYMPTOMS
HEMOPTYSIS: 0
FATIGUE: 0
LIGHT-HEADEDNESS: 0
EYE PROBLEMS: 0
HEADACHES: 0
ARTHRALGIAS: 0
PALPITATIONS: 0
COUGH: 1
LEG SWELLING: 0
MYALGIAS: 0
FEVER: 0
FREQUENCY: 0
DEPRESSION: 0
SHORTNESS OF BREATH: 0
ADENOPATHY: 0
TROUBLE SWALLOWING: 0
SORE THROAT: 1
BACK PAIN: 0
ABDOMINAL PAIN: 0
LOSS OF SENSATION IN FEET: 0
DIZZINESS: 0
OCCASIONAL FEELINGS OF UNSTEADINESS: 0

## 2024-02-06 ASSESSMENT — PATIENT HEALTH QUESTIONNAIRE - PHQ9
SUM OF ALL RESPONSES TO PHQ9 QUESTIONS 1 AND 2: 0
1. LITTLE INTEREST OR PLEASURE IN DOING THINGS: NOT AT ALL
2. FEELING DOWN, DEPRESSED OR HOPELESS: NOT AT ALL

## 2024-02-06 ASSESSMENT — PAIN SCALES - GENERAL: PAINLEVEL: 0-NO PAIN

## 2024-02-06 NOTE — PROGRESS NOTES
Pt presents to clinic for follow up with Beulah Huynh for management of anemia.    Pharmacy location current on file. Allergies updated. Medications reconciled.     Per orders,    Patient verbalizes understanding of the above instructions with no further questions or concerns at this time.

## 2024-02-06 NOTE — PROGRESS NOTES
"Patient ID: David Motta is a 88 y.o. male.    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  Phone conversations with Dr. Lock at the time (6/2022) confirmed  the lab findings and he responded nicely to steroids with complete resolution. He had Decadron 20mg x 4-5 days.  1/2023 he presented with SOB and dizzy with his walking. He again was given 5 days of steroids. Labs again consistent with autoimmune hemolytic anemia, completed long taper.      He currently has low grade URI  When he first saw Dr Niño he reported no new meds though he does take many OTC supplements- including a \"liver cleanse\"  she requested he stop that medication.  Now he is wearing X39 stem cell patch to prevent infections.  He does not complain of prior dizzy spells.   He continues to drink alcohol  He takes Trazadone to help him sleep, but does't feel groggy in the morning.   He denies any bleeding or bruising.  He no longer uses a walker      Review of Systems   Constitutional:  Negative for fatigue and fever.   HENT:   Positive for sore throat. Negative for hearing loss, mouth sores and trouble swallowing.    Eyes:  Negative for eye problems.   Respiratory:  Positive for cough. Negative for hemoptysis and shortness of breath.    Cardiovascular:  Negative for chest pain, leg swelling and palpitations.   Gastrointestinal:  Negative for abdominal pain.   Genitourinary:  Negative for frequency.    Musculoskeletal:  Negative for arthralgias, back pain, gait problem and myalgias.   Skin:  Negative for itching.        Scattered small pink spots, no typical for petecchiae   Neurological:  Negative for dizziness, gait problem, headaches and light-headedness.   Hematological:  Negative for adenopathy.      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   has a past surgical history that includes Total hip arthroplasty (Left, 2006); Colonoscopy (2007); Appendectomy (2005); Total hip arthroplasty (Right, 2010); Trigger finger release (Right, 2015); Colonoscopy (2018); Knee surgery (Left, 2018); and Eye surgery (2019).  Family History   Problem Relation Name Age of Onset    No Known Problems Mother      No Known Problems Father      Other (High Blood Pressure) Other Family History     Cancer Other Family History          David Predaynaek  reports that he has quit smoking. His smoking use included cigarettes. He has been exposed to tobacco smoke. He has never used smokeless tobacco.  He  reports current alcohol use.  He  reports no history of drug use.  HE is currently living in independent living facility    Physical Exam  Constitutional:       Comments: Appears tired and thin with dark circles under his eyes   Eyes:      Conjunctiva/sclera: Conjunctivae normal.      Pupils: Pupils are equal, round, and reactive to light.   Cardiovascular:      Rate and Rhythm: Normal rate and regular rhythm.      Pulses: Normal pulses.      Heart sounds: Normal heart sounds.   Pulmonary:      Effort: No respiratory  distress.      Breath sounds: Normal breath sounds. No wheezing, rhonchi or rales.   Abdominal:      General: There is no distension.      Tenderness: There is no abdominal tenderness.   Musculoskeletal:         General: Normal range of motion.   Lymphadenopathy:      Cervical: No cervical adenopathy.   Skin:     Comments: Faint widely scattered pink lesions not consistent with petecchiae   Neurological:      General: No focal deficit present.      Motor: Weakness present.   Psychiatric:         Mood and Affect: Mood normal.     WBC   Date/Time Value Ref Range Status   02/05/2024 01:55 PM 12.2 (H) 4.4 - 11.3 x10*3/uL Final   12/14/2023 09:12 AM 10.3 4.4 - 11.3 x10*3/uL Final   08/14/2023 12:53 PM 10.0 4.5 - 11.0 K/UL Final   08/01/2023 09:51 AM 8.4 4.4 - 11.3 x10E9/L Final   05/21/2023 10:14 AM 10.6 4.5 - 11.0 K/UL Final     nRBC   Date Value Ref Range Status   02/05/2024 0.0 0.0 - 0.0 /100 WBCs Final   12/14/2023 0.0 0.0 - 0.0 /100 WBCs Final   08/14/2023 0 0 /100 WBC Final     Comment:     Performed at [place] [id number]   05/21/2023 0 0 /100 WBC Final   05/08/2023 0 0 /100 WBC Final     Comment:     Performed at [place] [id number]     RBC   Date Value Ref Range Status   02/05/2024 5.40 4.50 - 5.90 x10*6/uL Final   12/14/2023 5.24 4.50 - 5.90 x10*6/uL Final   08/14/2023 5.21 4.5 - 5.5 M/UL Final   08/01/2023 5.54 4.50 - 5.90 x10E12/L Final   05/21/2023 5.22 4.5 - 5.5 M/UL Final     Hemoglobin   Date Value Ref Range Status   02/05/2024 16.9 13.5 - 17.5 g/dL Final   12/14/2023 17.1 13.5 - 17.5 g/dL Final   08/14/2023 16.7 (H) 13.5 - 16.5 GM/DL Final   08/01/2023 17.6 (H) 13.5 - 17.5 g/dL Final   05/21/2023 16.7 (H) 13.5 - 16.5 GM/DL Final     Hematocrit   Date Value Ref Range Status   02/05/2024 50.1 41.0 - 52.0 % Final   12/14/2023 52.2 (H) 41.0 - 52.0 % Final   08/14/2023 48.7 41 - 50 % Final   08/01/2023 51.4 41.0 - 52.0 % Final   05/21/2023 49.0 41 - 50 %  Final     MCV   Date/Time Value Ref Range Status   02/05/2024 01:55 PM 93 80 - 100 fL Final   12/14/2023 09:12  80 - 100 fL Final   08/14/2023 12:53 PM 93.5 80 - 100 FL Final   08/01/2023 09:51 AM 93 80 - 100 fL Final   05/21/2023 10:14 AM 93.9 80 - 100 FL Final     MCH   Date/Time Value Ref Range Status   02/05/2024 01:55 PM 31.3 26.0 - 34.0 pg Final   12/14/2023 09:12 AM 32.6 26.0 - 34.0 pg Final   08/14/2023 12:53 PM 32.1 26 - 34 PG Final     MCHC   Date/Time Value Ref Range Status   02/05/2024 01:55 PM 33.7 32.0 - 36.0 g/dL Final   12/14/2023 09:12 AM 32.8 32.0 - 36.0 g/dL Final   08/14/2023 12:53 PM 34.3 31 - 37 % Final   08/01/2023 09:51 AM 34.2 32.0 - 36.0 g/dL Final   05/21/2023 10:14 AM 34.1 31 - 37 % Final     RDW   Date/Time Value Ref Range Status   02/05/2024 01:55 PM 12.7 11.5 - 14.5 % Final   12/14/2023 09:12 AM 14.0 11.5 - 14.5 % Final   08/01/2023 09:51 AM 13.9 11.5 - 14.5 % Final   05/02/2023 08:40 AM 14.0 11.5 - 14.5 % Final   04/04/2023 12:52 PM 13.1 11.5 - 14.5 % Final     Platelets   Date/Time Value Ref Range Status   02/05/2024 01:55  150 - 450 x10*3/uL Final   12/14/2023 09:12  150 - 450 x10*3/uL Final   08/14/2023 12:53  150 - 450 K/UL Final   08/01/2023 09:51  150 - 450 x10E9/L Final   05/21/2023 10:14  150 - 450 K/UL Final     MPV   Date/Time Value Ref Range Status   08/14/2023 12:53 PM 10.7 7.0 - 12.6 CU Final   05/21/2023 10:14 AM 10.8 7.0 - 12.6 CU Final   05/08/2023 02:34 PM 11.1 7.0 - 12.6 CU Final     Neutrophils %   Date/Time Value Ref Range Status   02/05/2024 01:55 PM 80.1 40.0 - 80.0 % Final   08/01/2023 09:51 AM 76.4 40.0 - 80.0 % Final   05/02/2023 08:40 AM 70.6 40.0 - 80.0 % Final   04/04/2023 12:52 PM 88.5 40.0 - 80.0 % Final     Immature Granulocytes %, Automated   Date/Time Value Ref Range Status   02/05/2024 01:55 PM 0.7 0.0 - 0.9 % Final     Comment:     Immature Granulocyte Count (IG) includes promyelocytes, myelocytes and  metamyelocytes but does not include bands. Percent differential counts (%) should be interpreted in the context of the absolute cell counts (cells/UL).   08/01/2023 09:51 AM 0.2 0.0 - 0.9 % Final     Comment:      Immature Granulocyte Count (IG) includes promyelocytes,    myelocytes and metamyelocytes but does not include bands.   Percent differential counts (%) should be interpreted in the   context of the absolute cell counts (cells/L).     05/02/2023 08:40 AM 0.5 0.0 - 0.9 % Final     Comment:      Immature Granulocyte Count (IG) includes promyelocytes,    myelocytes and metamyelocytes but does not include bands.   Percent differential counts (%) should be interpreted in the   context of the absolute cell counts (cells/L).     04/04/2023 12:52 PM 1.2 (H) 0.0 - 0.9 % Final     Comment:      Immature Granulocyte Count (IG) includes promyelocytes,    myelocytes and metamyelocytes but does not include bands.   Percent differential counts (%) should be interpreted in the   context of the absolute cell counts (cells/L).       Lymphocytes %   Date/Time Value Ref Range Status   02/05/2024 01:55 PM 10.0 13.0 - 44.0 % Final   08/01/2023 09:51 AM 14.0 13.0 - 44.0 % Final   05/21/2023 10:14 AM 7.10 (L) 20 - 40 % Final   05/02/2023 08:40 AM 16.7 13.0 - 44.0 % Final   04/04/2023 12:52 PM 4.4 13.0 - 44.0 % Final   02/20/2023 10:18 AM 17.00 (L) 20 - 40 % Final   02/13/2023 09:17 AM 14.10 (L) 20 - 40 % Final     Monocytes %   Date/Time Value Ref Range Status   02/05/2024 01:55 PM 7.2 2.0 - 10.0 % Final   08/01/2023 09:51 AM 5.9 2.0 - 10.0 % Final   05/21/2023 10:14 AM 11.60 (H) 0 - 8 % Final   05/02/2023 08:40 AM 7.4 2.0 - 10.0 % Final   04/04/2023 12:52 PM 5.1 2.0 - 10.0 % Final   02/20/2023 10:18 AM 9.00 (H) 0 - 8 % Final   02/13/2023 09:17 AM 6.70 0 - 8 % Final     Eosinophils %   Date/Time Value Ref Range Status   02/05/2024 01:55 PM 1.6 0.0 - 6.0 % Final   08/01/2023 09:51 AM 3.1 0.0 - 6.0 % Final   05/02/2023 08:40 AM 4.1  0.0 - 6.0 % Final   04/04/2023 12:52 PM 0.5 0.0 - 6.0 % Final     Basophils %   Date/Time Value Ref Range Status   02/05/2024 01:55 PM 0.4 0.0 - 2.0 % Final   08/01/2023 09:51 AM 0.4 0.0 - 2.0 % Final   05/21/2023 10:14 AM 0.20 0 - 1 % Final   05/02/2023 08:40 AM 0.7 0.0 - 2.0 % Final     Neutrophils Absolute   Date/Time Value Ref Range Status   02/05/2024 01:55 PM 9.78 (H) 1.60 - 5.50 x10*3/uL Final     Comment:     Percent differential counts (%) should be interpreted in the context of the absolute cell counts (cells/uL).   08/01/2023 09:51 AM 6.43 (H) 1.60 - 5.50 x10E9/L Final   05/21/2023 10:14 AM 8.53 (H) 1.8 - 7.7 K/UL Final   05/02/2023 08:40 AM 5.72 (H) 1.60 - 5.50 x10E9/L Final     Immature Granulocytes Absolute, Automated   Date/Time Value Ref Range Status   02/05/2024 01:55 PM 0.08 0.00 - 0.50 x10*3/uL Final   05/21/2023 10:14 AM 0.04 0.0 - 0.1 K/UL Final   02/13/2023 09:17 AM 0.10 0.0 - 0.1 K/UL Final   06/27/2022 09:20 AM 0.53 (H) 0.0 - 0.1 K/UL Final     Lymphocytes Absolute   Date/Time Value Ref Range Status   02/05/2024 01:55 PM 1.22 0.80 - 3.00 x10*3/uL Final   08/01/2023 09:51 AM 1.18 0.80 - 3.00 x10E9/L Final   05/21/2023 10:14 AM 0.76 (L) 1.2 - 3.2 K/UL Final   05/02/2023 08:40 AM 1.35 0.80 - 3.00 x10E9/L Final     Monocytes Absolute   Date/Time Value Ref Range Status   02/05/2024 01:55 PM 0.88 (H) 0.05 - 0.80 x10*3/uL Final   08/01/2023 09:51 AM 0.50 0.05 - 0.80 x10E9/L Final   05/21/2023 10:14 AM 1.23 (H) 0 - 0.8 K/UL Final   05/02/2023 08:40 AM 0.60 0.05 - 0.80 x10E9/L Final     Eosinophils Absolute   Date/Time Value Ref Range Status   02/05/2024 01:55 PM 0.20 0.00 - 0.40 x10*3/uL Final   08/01/2023 09:51 AM 0.26 0.00 - 0.40 x10E9/L Final   05/21/2023 10:14 AM 0.06 0 - 0.45 K/UL Final   05/02/2023 08:40 AM 0.33 0.00 - 0.40 x10E9/L Final     Basophils Absolute   Date/Time Value Ref Range Status   02/05/2024 01:55 PM 0.05 0.00 - 0.10 x10*3/uL Final   08/01/2023 09:51 AM 0.03 0.00 - 0.10 x10E9/L  Final   05/21/2023 10:14 AM 0.02 0.00 - 0.22 K/UL Final   05/02/2023 08:40 AM 0.06 0.00 - 0.10 x10E9/L Final           Assessment/Plan    1. Auto immune hemolytic anemia  Relapsed after short course steroid though with a 6 mo remission noted.  He  finished a 2nd course but was still hemolyzing and had to do longer course with slow wean... starting at 40mg Prednisone with a planned decline of 5mg/weekly assuming stable hemogram.  He has been off steroids  and CBC is normal today      His previously reported symptoms may relate to some fatigue from the immune stress   No neuro symptoms otherwise noted.  Energy is improving.  He is no longer using a walker.        2. Comorbidities  DJD is limiting.  BPH also limits his sleep   HTN noted- no new meds.   Dizziness may be related to orthostasis or blood pressure medication resolved     Plan:    OV 6 months in [facility]     Diagnoses and all orders for this visit:  Autoimmune hemolytic anemia (CMS/HCC)  -     CBC and Auto Differential; Future  -     Clinic Appointment Request Follow up; Future  -     CBC and Auto Differential; Future  -     Clinic Appointment Request Follow up; Future           Beulah Huynh PA-C

## 2024-03-13 ENCOUNTER — LAB (OUTPATIENT)
Dept: LAB | Facility: LAB | Age: 89
End: 2024-03-13
Payer: MEDICARE

## 2024-03-13 DIAGNOSIS — Z01.89 ENCOUNTER FOR ROUTINE LABORATORY TESTING: ICD-10-CM

## 2024-03-13 DIAGNOSIS — E78.2 MIXED HYPERLIPIDEMIA: ICD-10-CM

## 2024-03-13 DIAGNOSIS — R73.9 HYPERGLYCEMIA: ICD-10-CM

## 2024-03-13 DIAGNOSIS — I10 PRIMARY HYPERTENSION: ICD-10-CM

## 2024-03-13 DIAGNOSIS — M10.9 GOUT, UNSPECIFIED CAUSE, UNSPECIFIED CHRONICITY, UNSPECIFIED SITE: ICD-10-CM

## 2024-03-13 LAB
ALBUMIN SERPL-MCNC: 4.4 G/DL (ref 3.5–5)
ALP BLD-CCNC: 76 U/L (ref 35–125)
ALT SERPL-CCNC: 12 U/L (ref 5–40)
ANION GAP SERPL CALC-SCNC: 12 MMOL/L
AST SERPL-CCNC: 11 U/L (ref 5–40)
BASOPHILS # BLD AUTO: 0.06 X10*3/UL (ref 0–0.1)
BASOPHILS NFR BLD AUTO: 0.6 %
BILIRUB SERPL-MCNC: 0.9 MG/DL (ref 0.1–1.2)
BUN SERPL-MCNC: 14 MG/DL (ref 8–25)
CALCIUM SERPL-MCNC: 9.2 MG/DL (ref 8.5–10.4)
CHLORIDE SERPL-SCNC: 95 MMOL/L (ref 97–107)
CO2 SERPL-SCNC: 29 MMOL/L (ref 24–31)
CREAT SERPL-MCNC: 1.1 MG/DL (ref 0.4–1.6)
EGFRCR SERPLBLD CKD-EPI 2021: 65 ML/MIN/1.73M*2
EOSINOPHIL # BLD AUTO: 0.21 X10*3/UL (ref 0–0.4)
EOSINOPHIL NFR BLD AUTO: 2.2 %
ERYTHROCYTE [DISTWIDTH] IN BLOOD BY AUTOMATED COUNT: 13.2 % (ref 11.5–14.5)
EST. AVERAGE GLUCOSE BLD GHB EST-MCNC: 114 MG/DL
GLUCOSE SERPL-MCNC: 155 MG/DL (ref 65–99)
HBA1C MFR BLD: 5.6 %
HCT VFR BLD AUTO: 51.1 % (ref 41–52)
HGB BLD-MCNC: 17 G/DL (ref 13.5–17.5)
IMM GRANULOCYTES # BLD AUTO: 0.03 X10*3/UL (ref 0–0.5)
IMM GRANULOCYTES NFR BLD AUTO: 0.3 % (ref 0–0.9)
LYMPHOCYTES # BLD AUTO: 1.19 X10*3/UL (ref 0.8–3)
LYMPHOCYTES NFR BLD AUTO: 12.3 %
MCH RBC QN AUTO: 31.4 PG (ref 26–34)
MCHC RBC AUTO-ENTMCNC: 33.3 G/DL (ref 32–36)
MCV RBC AUTO: 94 FL (ref 80–100)
MONOCYTES # BLD AUTO: 0.73 X10*3/UL (ref 0.05–0.8)
MONOCYTES NFR BLD AUTO: 7.5 %
NEUTROPHILS # BLD AUTO: 7.45 X10*3/UL (ref 1.6–5.5)
NEUTROPHILS NFR BLD AUTO: 77.1 %
NRBC BLD-RTO: 0 /100 WBCS (ref 0–0)
PLATELET # BLD AUTO: 235 X10*3/UL (ref 150–450)
POTASSIUM SERPL-SCNC: 4.3 MMOL/L (ref 3.4–5.1)
PROT SERPL-MCNC: 6.2 G/DL (ref 5.9–7.9)
RBC # BLD AUTO: 5.42 X10*6/UL (ref 4.5–5.9)
SODIUM SERPL-SCNC: 136 MMOL/L (ref 133–145)
URATE SERPL-MCNC: 8.4 MG/DL (ref 3.6–7.7)
WBC # BLD AUTO: 9.7 X10*3/UL (ref 4.4–11.3)

## 2024-03-13 PROCEDURE — 36415 COLL VENOUS BLD VENIPUNCTURE: CPT

## 2024-03-13 PROCEDURE — 85025 COMPLETE CBC W/AUTO DIFF WBC: CPT

## 2024-03-13 PROCEDURE — 83036 HEMOGLOBIN GLYCOSYLATED A1C: CPT

## 2024-03-13 PROCEDURE — 84550 ASSAY OF BLOOD/URIC ACID: CPT

## 2024-03-13 PROCEDURE — 80053 COMPREHEN METABOLIC PANEL: CPT

## 2024-03-14 PROBLEM — L98.9 INFLAMMATORY DERMATOSIS: Status: ACTIVE | Noted: 2023-05-21

## 2024-03-14 PROBLEM — R29.818 NEUROGENIC CLAUDICATION: Status: ACTIVE | Noted: 2020-04-29

## 2024-03-14 PROBLEM — M54.12 CERVICAL RADICULOPATHY: Status: ACTIVE | Noted: 2020-07-01

## 2024-03-14 PROBLEM — M75.110 PARTIAL THICKNESS ROTATOR CUFF TEAR: Status: ACTIVE | Noted: 2020-06-10

## 2024-03-14 PROBLEM — R21 RASH: Status: ACTIVE | Noted: 2024-03-14

## 2024-03-15 ENCOUNTER — OFFICE VISIT (OUTPATIENT)
Dept: PRIMARY CARE | Facility: CLINIC | Age: 89
End: 2024-03-15
Payer: MEDICARE

## 2024-03-15 VITALS
DIASTOLIC BLOOD PRESSURE: 70 MMHG | HEIGHT: 69 IN | WEIGHT: 198 LBS | HEART RATE: 91 BPM | BODY MASS INDEX: 29.33 KG/M2 | OXYGEN SATURATION: 95 % | TEMPERATURE: 97.4 F | SYSTOLIC BLOOD PRESSURE: 130 MMHG

## 2024-03-15 DIAGNOSIS — D59.10 AUTOIMMUNE HEMOLYTIC ANEMIA (MULTI): ICD-10-CM

## 2024-03-15 DIAGNOSIS — F41.9 ANXIETY: ICD-10-CM

## 2024-03-15 DIAGNOSIS — K21.9 GASTROESOPHAGEAL REFLUX DISEASE WITHOUT ESOPHAGITIS: ICD-10-CM

## 2024-03-15 DIAGNOSIS — E78.2 MIXED HYPERLIPIDEMIA: ICD-10-CM

## 2024-03-15 DIAGNOSIS — G62.9 NEUROPATHY: ICD-10-CM

## 2024-03-15 DIAGNOSIS — N40.0 BENIGN PROSTATIC HYPERPLASIA, UNSPECIFIED WHETHER LOWER URINARY TRACT SYMPTOMS PRESENT: ICD-10-CM

## 2024-03-15 DIAGNOSIS — Z01.89 ENCOUNTER FOR ROUTINE LABORATORY TESTING: ICD-10-CM

## 2024-03-15 DIAGNOSIS — N32.81 OVERACTIVE BLADDER: ICD-10-CM

## 2024-03-15 DIAGNOSIS — F32.A DEPRESSION, UNSPECIFIED DEPRESSION TYPE: ICD-10-CM

## 2024-03-15 DIAGNOSIS — M1A.9XX0 CHRONIC GOUT WITHOUT TOPHUS, UNSPECIFIED CAUSE, UNSPECIFIED SITE: ICD-10-CM

## 2024-03-15 DIAGNOSIS — I10 PRIMARY HYPERTENSION: Primary | ICD-10-CM

## 2024-03-15 DIAGNOSIS — E11.9 TYPE 2 DIABETES MELLITUS WITHOUT COMPLICATION, WITHOUT LONG-TERM CURRENT USE OF INSULIN (MULTI): ICD-10-CM

## 2024-03-15 DIAGNOSIS — M19.90 OSTEOARTHRITIS, UNSPECIFIED OSTEOARTHRITIS TYPE, UNSPECIFIED SITE: ICD-10-CM

## 2024-03-15 PROCEDURE — 3078F DIAST BP <80 MM HG: CPT | Performed by: INTERNAL MEDICINE

## 2024-03-15 PROCEDURE — 99214 OFFICE O/P EST MOD 30 MIN: CPT | Performed by: INTERNAL MEDICINE

## 2024-03-15 PROCEDURE — 1036F TOBACCO NON-USER: CPT | Performed by: INTERNAL MEDICINE

## 2024-03-15 PROCEDURE — 1159F MED LIST DOCD IN RCRD: CPT | Performed by: INTERNAL MEDICINE

## 2024-03-15 PROCEDURE — 1160F RVW MEDS BY RX/DR IN RCRD: CPT | Performed by: INTERNAL MEDICINE

## 2024-03-15 PROCEDURE — 1125F AMNT PAIN NOTED PAIN PRSNT: CPT | Performed by: INTERNAL MEDICINE

## 2024-03-15 PROCEDURE — 3075F SYST BP GE 130 - 139MM HG: CPT | Performed by: INTERNAL MEDICINE

## 2024-03-15 RX ORDER — ALLOPURINOL 100 MG/1
100 TABLET ORAL 2 TIMES DAILY
COMMUNITY
End: 2024-03-15 | Stop reason: SINTOL

## 2024-03-15 ASSESSMENT — PATIENT HEALTH QUESTIONNAIRE - PHQ9
1. LITTLE INTEREST OR PLEASURE IN DOING THINGS: NOT AT ALL
2. FEELING DOWN, DEPRESSED OR HOPELESS: NOT AT ALL
SUM OF ALL RESPONSES TO PHQ9 QUESTIONS 1 AND 2: 0

## 2024-03-15 ASSESSMENT — ENCOUNTER SYMPTOMS
RESPIRATORY NEGATIVE: 1
GASTROINTESTINAL NEGATIVE: 1
CARDIOVASCULAR NEGATIVE: 1

## 2024-03-15 ASSESSMENT — PAIN SCALES - GENERAL: PAINLEVEL: 7

## 2024-03-15 NOTE — PATIENT INSTRUCTIONS
Looks like you are doing reasonably well.  Lets keep your medications the same and I will plan on seeing you back in 3 months.  Please let me know if you start getting a lot of gouty flares as I am going to have to add some additional medication to lower your uric acid under 6.

## 2024-03-15 NOTE — PROGRESS NOTES
[facility]: [facility]  PROGRESS NOTE      David Motta is a 88 y.o. male that is presenting today for Follow-up.    Assessment/Plan   Diagnoses and all orders for this visit:  Primary hypertension  -     Comprehensive Metabolic Panel; Future  Mixed hyperlipidemia  -     Lipid Panel; Future  Anxiety  Autoimmune hemolytic anemia (CMS/HCC)  Benign prostatic hyperplasia, unspecified whether lower urinary tract symptoms present  Depression, unspecified depression type  Gastroesophageal reflux disease without esophagitis  -     CBC and Auto Differential; Future  Chronic gout without tophus, unspecified cause, unspecified site  Neuropathy  Osteoarthritis, unspecified osteoarthritis type, unspecified site  Overactive bladder  Type 2 diabetes mellitus without complication, without long-term current use of insulin (CMS/HCC)  -     Hemoglobin A1C; Future  Encounter for routine laboratory testing  -     Uric Acid; Future  -     Hemoglobin A1C; Future  Other orders  -     Follow Up In Primary Care - Established  -     Follow Up In Primary Care - Established; Future  Reviewed his overall situation.  His blood pressure is looking good his cholesterol is controlled.  He has not had any recurrence of his autoimmune hemolytic anemia.  His osteoarthritis is stable.  His blood sugars are stable and well-controlled.    Upon reviewing his blood work we do see his uric acid is a bit high at 8.4.  Recall he has not been able to tolerate the allopurinol which they think may have caused some of the rash on his legs.  He therefore is not on that medication have added this to his allergy list.  As of now I am going to consider the high uric acid just to be asymptomatic hyperuricemia I am not going to place him on another agent if he is in need of treatment then really we will have to move to a alternate agent such as Uloric if necessary I will plan on seeing him back in 3 months or sooner if he should need  me.  Subjective   Pt here for follow up care - he has been having issue with dust in his apartment - working with the building people in that regard.      Review of Systems   Respiratory: Negative.     Cardiovascular: Negative.    Gastrointestinal: Negative.    Genitourinary: Negative.       Objective   Vitals:    03/15/24 1136   BP: 130/70   Pulse: 91   Temp: 36.3 °C (97.4 °F)   SpO2: 95%      Body mass index is 29.24 kg/m².  Physical Exam  Cardiovascular:      Rate and Rhythm: Normal rate and regular rhythm.      Pulses: Normal pulses.      Heart sounds: Normal heart sounds.   Pulmonary:      Effort: Pulmonary effort is normal.      Breath sounds: Normal breath sounds.   Abdominal:      General: Abdomen is flat. Bowel sounds are normal.      Palpations: Abdomen is soft.   Musculoskeletal:         General: Normal range of motion.      Cervical back: Normal range of motion and neck supple.   Skin:     General: Skin is warm and dry.   Neurological:      General: No focal deficit present.       Diagnostic Results   Lab Results   Component Value Date    GLUCOSE 155 (H) 03/13/2024    CALCIUM 9.2 03/13/2024     03/13/2024    K 4.3 03/13/2024    CO2 29 03/13/2024    CL 95 (L) 03/13/2024    BUN 14 03/13/2024    CREATININE 1.10 03/13/2024     Lab Results   Component Value Date    ALT 12 03/13/2024    AST 11 03/13/2024    ALKPHOS 76 03/13/2024    BILITOT 0.9 03/13/2024     Lab Results   Component Value Date    WBC 9.7 03/13/2024    HGB 17.0 03/13/2024    HCT 51.1 03/13/2024    MCV 94 03/13/2024     03/13/2024     Lab Results   Component Value Date    CHOL 191 04/15/2022    CHOL 205 (H) 03/29/2021    CHOL 216 (H) 09/01/2020     Lab Results   Component Value Date    HDL 40 (L) 04/15/2022    HDL 42 03/29/2021    HDL 34 (L) 09/01/2020     Lab Results   Component Value Date    LDLCALC 130 04/15/2022    LDLCALC 137 (H) 03/29/2021    LDLCALC 155 (H) 09/01/2020     Lab Results   Component Value Date    TRIG 104  "04/15/2022    TRIG 132 03/29/2021    TRIG 136 09/01/2020     No components found for: \"CHOLHDL\"  Lab Results   Component Value Date    HGBA1C 5.6 03/13/2024     Other labs not included in the list above were reviewed either before or during this encounter.    History    Past Medical History:   Diagnosis Date    Anxiety 09/14/2023    Autoimmune hemolytic anemia (CMS/HCC) 09/14/2023    Back pain of lumbar region with sciatica 09/14/2023    Benign prostatic hyperplasia 09/14/2023    Depression 09/14/2023    Gastroesophageal reflux disease without esophagitis 09/14/2023    Hematuria syndrome 09/14/2023    Hyperlipidemia 09/14/2023    Hypertension 09/14/2023    Lumbar spondylosis 09/14/2023    Neuropathy 09/14/2023    Osteoarthritis 09/14/2023    Overactive bladder 09/14/2023    Type 2 diabetes mellitus without complication, without long-term current use of insulin (CMS/[facility]) 09/14/2023     Past Surgical History:   Procedure Laterality Date    APPENDECTOMY  2005    COLONOSCOPY  2007    also 2010    COLONOSCOPY  2018    EYE SURGERY  2019    KNEE SURGERY Left 2018    TOTAL HIP ARTHROPLASTY Left 2006    TOTAL HIP ARTHROPLASTY Right 2010    TRIGGER FINGER RELEASE Right 2015     Family History   Problem Relation Name Age of Onset    No Known Problems Mother      No Known Problems Father      Other (High Blood Pressure) Other Family History     Cancer Other Family History      Social History     Socioeconomic History    Marital status:      Spouse name: Not on file    Number of children: Not on file    Years of education: Not on file    Highest education level: Not on file   Occupational History    Not on file   Tobacco Use    Smoking status: Former     Types: Cigarettes     Passive exposure: Past    Smokeless tobacco: Never   Vaping Use    Vaping Use: Never used   Substance and Sexual Activity    Alcohol use: Yes     Comment: occasional    Drug use: Never    Sexual activity: Not on file   Other Topics Concern    Not on " file   Social History Narrative    Not on file     Social Determinants of Health     Financial Resource Strain: Not on file   Food Insecurity: Not on file   Transportation Needs: Not on file   Physical Activity: Not on file   Stress: Not on file   Social Connections: Not on file   Intimate Partner Violence: Not on file   Housing Stability: Not on file     Allergies   Allergen Reactions    Sulfa (Sulfonamide Antibiotics) Other    Levofloxacin Hives    Nitrofurantoin Monohyd/M-Cryst Itching    Atorvastatin Other    Dexamethasone Dizziness    Pravastatin Sodium Myalgia    Allopurinol Rash     Current Outpatient Medications on File Prior to Visit   Medication Sig Dispense Refill    amLODIPine (Norvasc) 5 mg tablet Take 1 tablet (5 mg) by mouth once daily.      ascorbic acid (Vitamin C) 500 mg tablet Take by mouth.      cholecalciferol, vitamin D3, (VITAMIN D3 ORAL) Take 1 tablet by mouth once daily.      finasteride (Proscar) 5 mg tablet Take 1 tablet (5 mg) by mouth once daily.      lisinopril 20 mg tablet Take 1 tablet (20 mg) by mouth 2 times a day.      magnesium oxide-Mg AA chelate (Magnesium, oxide/AA chelate,) 300 mg capsule Take 1 capsule (300 mg) by mouth once daily.      NON FORMULARY Lithsone ( Brain Vitamin)      tamsulosin HCl (TAMSULOSIN ORAL) Take by mouth.      traZODone (Desyrel) 50 mg tablet Take 1 tablet (50 mg) by mouth once daily at bedtime.      zinc gluconate 50 mg tablet Take 1 tablet (50 mg) by mouth once daily.      [DISCONTINUED] allopurinol (Zyloprim) 100 mg tablet Take 1 tablet (100 mg) by mouth 2 times a day.      predniSONE (Deltasone) 10 mg tablet Take 4 tablets (40 mg) by mouth once daily for 180 days, THEN 4 tablets (40 mg) once daily. Take 40 mg daily for 4 days, then 30 mg daily for 4 days, then 20 mg daily for 4 days, then 10 mg daily for 4 days. (Patient not taking: Reported on 2/6/2024) 40 tablet 0     No current facility-administered medications on file prior to visit.      Immunization History   Administered Date(s) Administered    Flu vaccine, quadrivalent, high-dose, preservative free, age 65y+ (FLUZONE) 12/03/2021, 10/19/2022    Influenza, Unspecified 10/19/2022    Moderna SARS-CoV-2 Vaccination 04/16/2021, 05/13/2021    Pneumococcal conjugate vaccine, 13-valent (PREVNAR 13) 06/06/2016    Td vaccine, age 7 years and older (TDVAX) 07/03/2003, 04/23/2013    Tdap vaccine, age 7 year and older (BOOSTRIX, ADACEL) 02/03/2016    Zoster, live 05/06/2014     Patient's medical history was reviewed and updated either before or during this encounter.       oMhan Lock MD

## 2024-04-14 DIAGNOSIS — F41.9 ANXIETY: ICD-10-CM

## 2024-04-14 DIAGNOSIS — I10 HYPERTENSION, UNSPECIFIED TYPE: ICD-10-CM

## 2024-04-15 RX ORDER — LISINOPRIL 20 MG/1
20 TABLET ORAL 2 TIMES DAILY
Qty: 180 TABLET | Refills: 3 | Status: SHIPPED | OUTPATIENT
Start: 2024-04-15

## 2024-04-15 RX ORDER — TRAZODONE HYDROCHLORIDE 50 MG/1
50 TABLET ORAL NIGHTLY
Qty: 90 TABLET | Refills: 3 | Status: SHIPPED | OUTPATIENT
Start: 2024-04-15

## 2024-05-23 ENCOUNTER — TELEPHONE (OUTPATIENT)
Dept: PRIMARY CARE | Facility: CLINIC | Age: 89
End: 2024-05-23
Payer: MEDICARE

## 2024-05-23 DIAGNOSIS — J32.9 SINUSITIS, UNSPECIFIED CHRONICITY, UNSPECIFIED LOCATION: Primary | ICD-10-CM

## 2024-05-23 RX ORDER — AMOXICILLIN 500 MG/1
500 CAPSULE ORAL EVERY 8 HOURS SCHEDULED
Qty: 21 CAPSULE | Refills: 0 | Status: SHIPPED | OUTPATIENT
Start: 2024-05-23 | End: 2024-05-30

## 2024-05-23 NOTE — TELEPHONE ENCOUNTER
Pt c/o stuffy nose, sinus congestion, productive cough with yellow mucous x5 days. Taking tylenol and allegra OTC. Negative COVID test this morning. Please advise.

## 2024-05-24 NOTE — TELEPHONE ENCOUNTER
Patient advised. Patient states that he is having a hard time breathing. IO advised that if he is having difficulty breathing he should be seen in ED. Patient declined and stated that is going to see if the medication helps.

## 2024-06-17 ENCOUNTER — LAB (OUTPATIENT)
Dept: LAB | Facility: LAB | Age: 89
End: 2024-06-17
Payer: MEDICARE

## 2024-06-17 DIAGNOSIS — E11.9 TYPE 2 DIABETES MELLITUS WITHOUT COMPLICATION, WITHOUT LONG-TERM CURRENT USE OF INSULIN (MULTI): ICD-10-CM

## 2024-06-17 DIAGNOSIS — I10 PRIMARY HYPERTENSION: ICD-10-CM

## 2024-06-17 DIAGNOSIS — E78.2 MIXED HYPERLIPIDEMIA: ICD-10-CM

## 2024-06-17 DIAGNOSIS — K21.9 GASTROESOPHAGEAL REFLUX DISEASE WITHOUT ESOPHAGITIS: ICD-10-CM

## 2024-06-17 DIAGNOSIS — Z01.89 ENCOUNTER FOR ROUTINE LABORATORY TESTING: ICD-10-CM

## 2024-06-17 LAB
ALBUMIN SERPL-MCNC: 4.4 G/DL (ref 3.5–5)
ALP BLD-CCNC: 74 U/L (ref 35–125)
ALT SERPL-CCNC: 15 U/L (ref 5–40)
ANION GAP SERPL CALC-SCNC: 13 MMOL/L
AST SERPL-CCNC: 13 U/L (ref 5–40)
BASOPHILS # BLD AUTO: 0.05 X10*3/UL (ref 0–0.1)
BASOPHILS NFR BLD AUTO: 0.6 %
BILIRUB SERPL-MCNC: 1 MG/DL (ref 0.1–1.2)
BUN SERPL-MCNC: 14 MG/DL (ref 8–25)
CALCIUM SERPL-MCNC: 9.3 MG/DL (ref 8.5–10.4)
CHLORIDE SERPL-SCNC: 99 MMOL/L (ref 97–107)
CHOLEST SERPL-MCNC: 210 MG/DL (ref 133–200)
CHOLEST/HDLC SERPL: 6 {RATIO}
CO2 SERPL-SCNC: 27 MMOL/L (ref 24–31)
CREAT SERPL-MCNC: 1.1 MG/DL (ref 0.4–1.6)
EGFRCR SERPLBLD CKD-EPI 2021: 65 ML/MIN/1.73M*2
EOSINOPHIL # BLD AUTO: 0.18 X10*3/UL (ref 0–0.4)
EOSINOPHIL NFR BLD AUTO: 2.2 %
ERYTHROCYTE [DISTWIDTH] IN BLOOD BY AUTOMATED COUNT: 14 % (ref 11.5–14.5)
EST. AVERAGE GLUCOSE BLD GHB EST-MCNC: 114 MG/DL
GLUCOSE SERPL-MCNC: 156 MG/DL (ref 65–99)
HBA1C MFR BLD: 5.6 %
HCT VFR BLD AUTO: 50.8 % (ref 41–52)
HDLC SERPL-MCNC: 35 MG/DL
HGB BLD-MCNC: 17.1 G/DL (ref 13.5–17.5)
IMM GRANULOCYTES # BLD AUTO: 0.02 X10*3/UL (ref 0–0.5)
IMM GRANULOCYTES NFR BLD AUTO: 0.2 % (ref 0–0.9)
LDLC SERPL CALC-MCNC: 124 MG/DL (ref 65–130)
LYMPHOCYTES # BLD AUTO: 1.22 X10*3/UL (ref 0.8–3)
LYMPHOCYTES NFR BLD AUTO: 15.2 %
MCH RBC QN AUTO: 30.9 PG (ref 26–34)
MCHC RBC AUTO-ENTMCNC: 33.7 G/DL (ref 32–36)
MCV RBC AUTO: 92 FL (ref 80–100)
MONOCYTES # BLD AUTO: 0.64 X10*3/UL (ref 0.05–0.8)
MONOCYTES NFR BLD AUTO: 8 %
NEUTROPHILS # BLD AUTO: 5.91 X10*3/UL (ref 1.6–5.5)
NEUTROPHILS NFR BLD AUTO: 73.8 %
NRBC BLD-RTO: 0 /100 WBCS (ref 0–0)
PLATELET # BLD AUTO: 223 X10*3/UL (ref 150–450)
POTASSIUM SERPL-SCNC: 4 MMOL/L (ref 3.4–5.1)
PROT SERPL-MCNC: 6.5 G/DL (ref 5.9–7.9)
RBC # BLD AUTO: 5.54 X10*6/UL (ref 4.5–5.9)
SODIUM SERPL-SCNC: 139 MMOL/L (ref 133–145)
TRIGL SERPL-MCNC: 254 MG/DL (ref 40–150)
URATE SERPL-MCNC: 7.7 MG/DL (ref 3.6–7.7)
WBC # BLD AUTO: 8 X10*3/UL (ref 4.4–11.3)

## 2024-06-17 PROCEDURE — 80061 LIPID PANEL: CPT

## 2024-06-17 PROCEDURE — 85025 COMPLETE CBC W/AUTO DIFF WBC: CPT

## 2024-06-17 PROCEDURE — 80053 COMPREHEN METABOLIC PANEL: CPT

## 2024-06-17 PROCEDURE — 36415 COLL VENOUS BLD VENIPUNCTURE: CPT

## 2024-06-17 PROCEDURE — 83036 HEMOGLOBIN GLYCOSYLATED A1C: CPT

## 2024-06-17 PROCEDURE — 84550 ASSAY OF BLOOD/URIC ACID: CPT

## 2024-06-18 RX ORDER — TAMSULOSIN HYDROCHLORIDE 0.4 MG/1
0.4 CAPSULE ORAL
COMMUNITY
Start: 2024-05-16

## 2024-06-18 RX ORDER — FLUOCINONIDE 0.5 MG/G
CREAM TOPICAL
COMMUNITY
Start: 2024-03-18

## 2024-06-18 NOTE — PROGRESS NOTES
[facility]: [facility]  PROGRESS NOTE      David Motta is a 89 y.o. male that is presenting today for Follow-up.    Assessment/Plan   Diagnoses and all orders for this visit:  Primary hypertension  -     CBC and Auto Differential; Future  Mixed hyperlipidemia  -     Comprehensive Metabolic Panel; Future  Gastroesophageal reflux disease without esophagitis  Depression, unspecified depression type  Lumbar spondylosis  Neurogenic claudication  Neuropathy  Osteoarthritis, unspecified osteoarthritis type, unspecified site  Overactive bladder  Type 2 diabetes mellitus without complication, without long-term current use of insulin (Multi)  -     Hemoglobin A1C; Future  Encounter for routine laboratory testing  -     Hemoglobin A1C; Future  Other orders  -     Follow Up In Primary Care - Established  -     Follow Up In Primary Care - Medicare Annual; Future  -     Follow Up In Primary Care - Established; Future  We reviewed his overall situation and his recent blood work.  The blood pressure is stable and at goal.  The cholesterol is stable and at goal.  Blood sugars are stable and at goal.  His neuropathy symptoms are pretty much stable there is no reason for us to change any key thing in his medications and I will just plan on seeing him back in 3 months unless he should need me sooner  Subjective   He is me for his follow-up visit.  For the most part he was doing well until he developed an abdominal problem a few weeks ago that he thinks was diverticulitis.  He openly acknowledges that he ate a whole ear of corn and then the next day began to have abdominal discomfort consistent with his previous diverticulitis.  He really cut back on his diet and has noticed that on a daily basis this has been markedly improved and his bowels are starting to return back to normal.  He is not having any fever chills or sweats.  Otherwise he has not really experienced any other problems since I last saw  "him.      Review of Systems   Eyes: Negative.    Respiratory: Negative.     Cardiovascular: Negative.    Gastrointestinal: Negative.         See hpi   Genitourinary: Negative.    Musculoskeletal: Negative.       Objective   Vitals:    06/19/24 1018   BP: 134/78   Pulse: 82   Temp: 36.4 °C (97.6 °F)   SpO2: 95%      Body mass index is 28.65 kg/m².  Physical Exam  Cardiovascular:      Rate and Rhythm: Normal rate and regular rhythm.      Pulses: Normal pulses.      Heart sounds: Normal heart sounds.   Pulmonary:      Effort: Pulmonary effort is normal.      Breath sounds: Normal breath sounds.   Abdominal:      General: Abdomen is flat. Bowel sounds are normal.      Palpations: Abdomen is soft.   Musculoskeletal:         General: Normal range of motion.      Cervical back: Normal range of motion and neck supple.   Skin:     General: Skin is warm and dry.       Diagnostic Results   Lab Results   Component Value Date    GLUCOSE 156 (H) 06/17/2024    CALCIUM 9.3 06/17/2024     06/17/2024    K 4.0 06/17/2024    CO2 27 06/17/2024    CL 99 06/17/2024    BUN 14 06/17/2024    CREATININE 1.10 06/17/2024     Lab Results   Component Value Date    ALT 15 06/17/2024    AST 13 06/17/2024    ALKPHOS 74 06/17/2024    BILITOT 1.0 06/17/2024     Lab Results   Component Value Date    WBC 8.0 06/17/2024    HGB 17.1 06/17/2024    HCT 50.8 06/17/2024    MCV 92 06/17/2024     06/17/2024     Lab Results   Component Value Date    CHOL 210 (H) 06/17/2024    CHOL 191 04/15/2022    CHOL 205 (H) 03/29/2021     Lab Results   Component Value Date    HDL 35.0 (L) 06/17/2024    HDL 40 (L) 04/15/2022    HDL 42 03/29/2021     Lab Results   Component Value Date    LDLCALC 124 06/17/2024    LDLCALC 130 04/15/2022    LDLCALC 137 (H) 03/29/2021     Lab Results   Component Value Date    TRIG 254 (H) 06/17/2024    TRIG 104 04/15/2022    TRIG 132 03/29/2021     No components found for: \"CHOLHDL\"  Lab Results   Component Value Date    HGBA1C 5.6 " 06/17/2024     Other labs not included in the list above were reviewed either before or during this encounter.    History    Past Medical History:   Diagnosis Date    Anxiety 09/14/2023    Autoimmune hemolytic anemia (Multi) 09/14/2023    Back pain of lumbar region with sciatica 09/14/2023    Benign prostatic hyperplasia 09/14/2023    Depression 09/14/2023    Gastroesophageal reflux disease without esophagitis 09/14/2023    Hematuria syndrome 09/14/2023    Hyperlipidemia 09/14/2023    Hypertension 09/14/2023    Lumbar spondylosis 09/14/2023    Neuropathy 09/14/2023    Osteoarthritis 09/14/2023    Overactive bladder 09/14/2023    Type 2 diabetes mellitus without complication, without long-term current use of insulin (Multi) 09/14/2023     Past Surgical History:   Procedure Laterality Date    APPENDECTOMY  2005    COLONOSCOPY  2007    also 2010    COLONOSCOPY  2018    EYE SURGERY  2019    KNEE SURGERY Left 2018    TOTAL HIP ARTHROPLASTY Left 2006    TOTAL HIP ARTHROPLASTY Right 2010    TRIGGER FINGER RELEASE Right 2015     Family History   Problem Relation Name Age of Onset    No Known Problems Mother      No Known Problems Father      Other (High Blood Pressure) Other Family History     Cancer Other Family History      Social History     Socioeconomic History    Marital status:      Spouse name: Not on file    Number of children: Not on file    Years of education: Not on file    Highest education level: Not on file   Occupational History    Not on file   Tobacco Use    Smoking status: Former     Types: Cigarettes     Passive exposure: Past    Smokeless tobacco: Never   Vaping Use    Vaping status: Never Used   Substance and Sexual Activity    Alcohol use: Yes     Comment: occasional    Drug use: Never    Sexual activity: Not on file   Other Topics Concern    Not on file   Social History Narrative    Not on file     Social Determinants of Health     Financial Resource Strain: Not on file   Food Insecurity: Not  on file   Transportation Needs: Not on file   Physical Activity: Not on file   Stress: Not on file   Social Connections: Not on file   Intimate Partner Violence: Not on file   Housing Stability: Not on file     Allergies   Allergen Reactions    Sulfa (Sulfonamide Antibiotics) Other    Levofloxacin Hives    Nitrofurantoin Monohyd/M-Cryst Itching    Atorvastatin Other    Dexamethasone Dizziness    Pravastatin Sodium Myalgia    Allopurinol Rash     Current Outpatient Medications on File Prior to Visit   Medication Sig Dispense Refill    amLODIPine (Norvasc) 5 mg tablet Take 1 tablet (5 mg) by mouth once daily.      ascorbic acid (Vitamin C) 500 mg tablet Take by mouth.      cholecalciferol, vitamin D3, (VITAMIN D3 ORAL) Take 1 tablet by mouth once daily.      finasteride (Proscar) 5 mg tablet Take 1 tablet (5 mg) by mouth once daily.      fluocinonide 0.05 % cream APPLY TOPICALLY TO THE AFFECTED AREA TWICE DAILY AS NEEDED FOR FLARES      lisinopril 20 mg tablet TAKE 1 TABLET BY MOUTH TWICE  DAILY 180 tablet 3    magnesium oxide-Mg AA chelate (Magnesium, oxide/AA chelate,) 300 mg capsule Take 1 capsule (300 mg) by mouth once daily.      NON FORMULARY Lithsone ( Brain Vitamin)      tamsulosin (Flomax) 0.4 mg 24 hr capsule Take 1 capsule (0.4 mg) by mouth. AT BEDTIME      traZODone (Desyrel) 50 mg tablet TAKE 1 TABLET BY MOUTH ONCE  DAILY AT BEDTIME 90 tablet 3    zinc gluconate 50 mg tablet Take 1 tablet (50 mg) by mouth once daily.      predniSONE (Deltasone) 10 mg tablet Take 4 tablets (40 mg) by mouth once daily for 180 days, THEN 4 tablets (40 mg) once daily. Take 40 mg daily for 4 days, then 30 mg daily for 4 days, then 20 mg daily for 4 days, then 10 mg daily for 4 days. (Patient not taking: Reported on 2/6/2024) 40 tablet 0    [DISCONTINUED] tamsulosin HCl (TAMSULOSIN ORAL) Take by mouth.       No current facility-administered medications on file prior to visit.     Immunization History   Administered Date(s)  Administered    Flu vaccine, quadrivalent, high-dose, preservative free, age 65y+ (FLUZONE) 12/03/2021, 10/19/2022    Influenza, Unspecified 10/19/2022    Moderna SARS-CoV-2 Vaccination 04/16/2021, 05/13/2021    Pneumococcal conjugate vaccine, 13-valent (PREVNAR 13) 06/06/2016    Td vaccine, age 7 years and older (TDVAX) 07/03/2003, 04/23/2013    Tdap vaccine, age 7 year and older (BOOSTRIX, ADACEL) 02/03/2016    Zoster, live 05/06/2014     Patient's medical history was reviewed and updated either before or during this encounter.       Mohan Lock MD

## 2024-06-19 ENCOUNTER — OFFICE VISIT (OUTPATIENT)
Dept: PRIMARY CARE | Facility: CLINIC | Age: 89
End: 2024-06-19
Payer: MEDICARE

## 2024-06-19 VITALS
HEART RATE: 82 BPM | OXYGEN SATURATION: 95 % | WEIGHT: 194 LBS | SYSTOLIC BLOOD PRESSURE: 134 MMHG | TEMPERATURE: 97.6 F | BODY MASS INDEX: 28.65 KG/M2 | DIASTOLIC BLOOD PRESSURE: 78 MMHG

## 2024-06-19 DIAGNOSIS — Z01.89 ENCOUNTER FOR ROUTINE LABORATORY TESTING: ICD-10-CM

## 2024-06-19 DIAGNOSIS — G62.9 NEUROPATHY: ICD-10-CM

## 2024-06-19 DIAGNOSIS — K21.9 GASTROESOPHAGEAL REFLUX DISEASE WITHOUT ESOPHAGITIS: ICD-10-CM

## 2024-06-19 DIAGNOSIS — F32.A DEPRESSION, UNSPECIFIED DEPRESSION TYPE: ICD-10-CM

## 2024-06-19 DIAGNOSIS — E78.2 MIXED HYPERLIPIDEMIA: ICD-10-CM

## 2024-06-19 DIAGNOSIS — N32.81 OVERACTIVE BLADDER: ICD-10-CM

## 2024-06-19 DIAGNOSIS — E11.9 TYPE 2 DIABETES MELLITUS WITHOUT COMPLICATION, WITHOUT LONG-TERM CURRENT USE OF INSULIN (MULTI): ICD-10-CM

## 2024-06-19 DIAGNOSIS — R29.818 NEUROGENIC CLAUDICATION: ICD-10-CM

## 2024-06-19 DIAGNOSIS — M47.816 LUMBAR SPONDYLOSIS: ICD-10-CM

## 2024-06-19 DIAGNOSIS — M19.90 OSTEOARTHRITIS, UNSPECIFIED OSTEOARTHRITIS TYPE, UNSPECIFIED SITE: ICD-10-CM

## 2024-06-19 DIAGNOSIS — I10 PRIMARY HYPERTENSION: Primary | ICD-10-CM

## 2024-06-19 PROCEDURE — 3075F SYST BP GE 130 - 139MM HG: CPT | Performed by: INTERNAL MEDICINE

## 2024-06-19 PROCEDURE — 3078F DIAST BP <80 MM HG: CPT | Performed by: INTERNAL MEDICINE

## 2024-06-19 PROCEDURE — 1159F MED LIST DOCD IN RCRD: CPT | Performed by: INTERNAL MEDICINE

## 2024-06-19 PROCEDURE — 1036F TOBACCO NON-USER: CPT | Performed by: INTERNAL MEDICINE

## 2024-06-19 PROCEDURE — G2211 COMPLEX E/M VISIT ADD ON: HCPCS | Performed by: INTERNAL MEDICINE

## 2024-06-19 PROCEDURE — 1160F RVW MEDS BY RX/DR IN RCRD: CPT | Performed by: INTERNAL MEDICINE

## 2024-06-19 PROCEDURE — 99214 OFFICE O/P EST MOD 30 MIN: CPT | Performed by: INTERNAL MEDICINE

## 2024-06-19 PROCEDURE — 1126F AMNT PAIN NOTED NONE PRSNT: CPT | Performed by: INTERNAL MEDICINE

## 2024-06-19 ASSESSMENT — ENCOUNTER SYMPTOMS
RESPIRATORY NEGATIVE: 1
GASTROINTESTINAL NEGATIVE: 1
OCCASIONAL FEELINGS OF UNSTEADINESS: 0
MUSCULOSKELETAL NEGATIVE: 1
ROS GI COMMENTS: SEE HPI
LOSS OF SENSATION IN FEET: 0
DEPRESSION: 0
CARDIOVASCULAR NEGATIVE: 1
EYES NEGATIVE: 1

## 2024-06-19 ASSESSMENT — PATIENT HEALTH QUESTIONNAIRE - PHQ9
2. FEELING DOWN, DEPRESSED OR HOPELESS: NOT AT ALL
1. LITTLE INTEREST OR PLEASURE IN DOING THINGS: NOT AT ALL
SUM OF ALL RESPONSES TO PHQ9 QUESTIONS 1 AND 2: 0

## 2024-06-19 ASSESSMENT — PAIN SCALES - GENERAL: PAINLEVEL: 0-NO PAIN

## 2024-06-19 NOTE — PATIENT INSTRUCTIONS
Santos it looks like you are really doing okay you might of had a small episode of diverticulitis that you handled on your own I think I agree with you that right now you improved to the point that there really would be no point to any evaluation or treatment of such unless the symptoms should recur.    Otherwise lets keep your medications the same and I will look forward to seeing you back in 3 months unless you need me.

## 2024-07-01 DIAGNOSIS — I10 HYPERTENSION, UNSPECIFIED TYPE: ICD-10-CM

## 2024-07-01 RX ORDER — AMLODIPINE BESYLATE 5 MG/1
5 TABLET ORAL 2 TIMES DAILY
Qty: 180 TABLET | Refills: 3 | Status: SHIPPED | OUTPATIENT
Start: 2024-07-01

## 2024-08-06 ENCOUNTER — LAB (OUTPATIENT)
Dept: LAB | Facility: CLINIC | Age: 89
End: 2024-08-06
Payer: MEDICARE

## 2024-08-06 ENCOUNTER — OFFICE VISIT (OUTPATIENT)
Dept: HEMATOLOGY/ONCOLOGY | Facility: CLINIC | Age: 89
End: 2024-08-06
Payer: MEDICARE

## 2024-08-06 VITALS
SYSTOLIC BLOOD PRESSURE: 136 MMHG | DIASTOLIC BLOOD PRESSURE: 78 MMHG | HEART RATE: 90 BPM | OXYGEN SATURATION: 94 % | RESPIRATION RATE: 18 BRPM | HEIGHT: 69 IN | WEIGHT: 192.13 LBS | BODY MASS INDEX: 28.46 KG/M2 | TEMPERATURE: 97.4 F

## 2024-08-06 DIAGNOSIS — D59.10 AUTOIMMUNE HEMOLYTIC ANEMIA (MULTI): ICD-10-CM

## 2024-08-06 LAB
BASOPHILS # BLD AUTO: 0.04 X10*3/UL (ref 0–0.1)
BASOPHILS NFR BLD AUTO: 0.3 %
EOSINOPHIL # BLD AUTO: 0.28 X10*3/UL (ref 0–0.4)
EOSINOPHIL NFR BLD AUTO: 2.3 %
ERYTHROCYTE [DISTWIDTH] IN BLOOD BY AUTOMATED COUNT: 13.3 % (ref 11.5–14.5)
HCT VFR BLD AUTO: 49.6 % (ref 41–52)
HGB BLD-MCNC: 17.1 G/DL (ref 13.5–17.5)
IMM GRANULOCYTES # BLD AUTO: 0.04 X10*3/UL (ref 0–0.5)
IMM GRANULOCYTES NFR BLD AUTO: 0.3 % (ref 0–0.9)
LYMPHOCYTES # BLD AUTO: 1.06 X10*3/UL (ref 0.8–3)
LYMPHOCYTES NFR BLD AUTO: 8.8 %
MCH RBC QN AUTO: 31.4 PG (ref 26–34)
MCHC RBC AUTO-ENTMCNC: 34.5 G/DL (ref 32–36)
MCV RBC AUTO: 91 FL (ref 80–100)
MONOCYTES # BLD AUTO: 0.66 X10*3/UL (ref 0.05–0.8)
MONOCYTES NFR BLD AUTO: 5.5 %
NEUTROPHILS # BLD AUTO: 9.98 X10*3/UL (ref 1.6–5.5)
NEUTROPHILS NFR BLD AUTO: 82.8 %
NRBC BLD-RTO: 0 /100 WBCS (ref 0–0)
PLATELET # BLD AUTO: 287 X10*3/UL (ref 150–450)
RBC # BLD AUTO: 5.45 X10*6/UL (ref 4.5–5.9)
WBC # BLD AUTO: 12.1 X10*3/UL (ref 4.4–11.3)

## 2024-08-06 PROCEDURE — 99214 OFFICE O/P EST MOD 30 MIN: CPT | Performed by: NURSE PRACTITIONER

## 2024-08-06 PROCEDURE — 85025 COMPLETE CBC W/AUTO DIFF WBC: CPT

## 2024-08-06 PROCEDURE — 1159F MED LIST DOCD IN RCRD: CPT | Performed by: NURSE PRACTITIONER

## 2024-08-06 PROCEDURE — 3078F DIAST BP <80 MM HG: CPT | Performed by: NURSE PRACTITIONER

## 2024-08-06 PROCEDURE — 1125F AMNT PAIN NOTED PAIN PRSNT: CPT | Performed by: NURSE PRACTITIONER

## 2024-08-06 PROCEDURE — 36415 COLL VENOUS BLD VENIPUNCTURE: CPT

## 2024-08-06 PROCEDURE — 3075F SYST BP GE 130 - 139MM HG: CPT | Performed by: NURSE PRACTITIONER

## 2024-08-06 ASSESSMENT — ENCOUNTER SYMPTOMS
RESPIRATORY NEGATIVE: 1
ARTHRALGIAS: 1
OCCASIONAL FEELINGS OF UNSTEADINESS: 1
NECK PAIN: 0
BACK PAIN: 0
FEVER: 0
FATIGUE: 0
MYALGIAS: 0
EYES NEGATIVE: 1
CARDIOVASCULAR NEGATIVE: 1
LOSS OF SENSATION IN FEET: 0
GASTROINTESTINAL NEGATIVE: 1
UNEXPECTED WEIGHT CHANGE: 0
DEPRESSION: 0
DIAPHORESIS: 0
HEMATOLOGIC/LYMPHATIC NEGATIVE: 1

## 2024-08-06 ASSESSMENT — PAIN SCALES - GENERAL: PAINLEVEL: 7

## 2024-08-06 ASSESSMENT — PATIENT HEALTH QUESTIONNAIRE - PHQ9
2. FEELING DOWN, DEPRESSED OR HOPELESS: SEVERAL DAYS
10. IF YOU CHECKED OFF ANY PROBLEMS, HOW DIFFICULT HAVE THESE PROBLEMS MADE IT FOR YOU TO DO YOUR WORK, TAKE CARE OF THINGS AT HOME, OR GET ALONG WITH OTHER PEOPLE: SOMEWHAT DIFFICULT
1. LITTLE INTEREST OR PLEASURE IN DOING THINGS: NOT AT ALL
SUM OF ALL RESPONSES TO PHQ9 QUESTIONS 1 AND 2: 1

## 2024-08-06 NOTE — PROGRESS NOTES
"Patient ID: David Motta is a 89 y.o. male.  Referring Physician: Beulah Huynh, ULISES  [address]  [place] [address]  Primary Care Provider: Mohan Lock MD  Visit Type: Follow Up      Subjective    HPI  Patient presented to primary care with fatigue 2022 and found to have autoimmune hemolytic anemia.  Phone conversations with Dr. Lock at the time (6/2022) confirmed  the lab findings and he responded nicely to steroids with complete resolution. He had Decadron 20mg x 4-5 days.  1/2023 he presented with SOB and dizzy with his walking. He again was given 5 days of steroids. Labs again consistent with autoimmune hemolytic anemia, completed long taper.      When he first saw Dr Niño he reported no new meds though he does take many OTC supplements- including a \"liver cleanse\"  she requested he stop that medication.  Now he is wearing X39 stem cell patch to prevent infections.  He does not complain of prior dizzy spells.   He continues to drink alcohol  He takes Trazadone to help him sleep, but does't feel groggy in the morning.   He denies any bleeding or bruising.  He uses a walker due to recent fall and knee injury.  Labs done 6/17/24 showed WBC 8, hgb 17.1 and plt 223 with normal kidney and liver function test.       Review of Systems   Constitutional:  Negative for diaphoresis, fatigue, fever and unexpected weight change.   HENT:  Negative.     Eyes: Negative.    Respiratory: Negative.     Cardiovascular: Negative.    Gastrointestinal: Negative.    Musculoskeletal:  Positive for arthralgias and gait problem. Negative for back pain, myalgias and neck pain.   Skin: Negative.    Neurological:  Positive for gait problem.   Hematological: Negative.       Objective   BSA: 2.07 meters squared  /78 (BP Location: Left arm, Patient Position: Sitting, BP Cuff Size: Adult long)   Pulse 90   Temp 36.3 °C (97.4 °F) (Temporal)   Resp 18   Ht (S) 1.765 m (5' 9.49\")   Wt 87.1 kg (192 lb 2.1 oz)  "  SpO2 94%   BMI 27.98 kg/m²      has a past medical history of Anxiety (09/14/2023), Autoimmune hemolytic anemia (Multi) (09/14/2023), Back pain of lumbar region with sciatica (09/14/2023), Benign prostatic hyperplasia (09/14/2023), Depression (09/14/2023), Gastroesophageal reflux disease without esophagitis (09/14/2023), Hematuria syndrome (09/14/2023), Hyperlipidemia (09/14/2023), Hypertension (09/14/2023), Lumbar spondylosis (09/14/2023), Neuropathy (09/14/2023), Osteoarthritis (09/14/2023), Overactive bladder (09/14/2023), and Type 2 diabetes mellitus without complication, without long-term current use of insulin (Multi) (09/14/2023).   has a past surgical history that includes Total hip arthroplasty (Left, 2006); Colonoscopy (2007); Appendectomy (2005); Total hip arthroplasty (Right, 2010); Trigger finger release (Right, 2015); Colonoscopy (2018); Knee surgery (Left, 2018); and Eye surgery (2019).  Family History   Problem Relation Name Age of Onset    No Known Problems Mother      No Known Problems Father      Other (High Blood Pressure) Other Family History     Cancer Other Family History          David Motta  reports that he has quit smoking. His smoking use included cigarettes. He has been exposed to tobacco smoke. He has never used smokeless tobacco.  He  reports current alcohol use.  He  reports no history of drug use.    Physical Exam  Constitutional:       Comments: Using walker for recent knee injury   Eyes:      Conjunctiva/sclera: Conjunctivae normal.      Pupils: Pupils are equal, round, and reactive to light.   Cardiovascular:      Rate and Rhythm: Normal rate and regular rhythm.      Pulses: Normal pulses.      Heart sounds: Murmur heard.      Comments: New heart murmur heard on exam with rumbling systolic murmur.  No evidence of pedal edema, lung fluid overload or abdominal fluid   Pulmonary:      Effort: No respiratory distress.      Breath sounds: No stridor. No wheezing or rhonchi.    Abdominal:      General: There is no distension.      Palpations: There is no mass.      Tenderness: There is no abdominal tenderness.      Hernia: No hernia is present.   Musculoskeletal:         General: Swelling present.      Comments: Right knee swelling post fall, but no bruising, erythema or warmth   Lymphadenopathy:      Cervical: No cervical adenopathy.   Skin:     Coloration: Skin is not jaundiced or pale.      Findings: No bruising or erythema.   Neurological:      Gait: Gait abnormal.     WBC   Date/Time Value Ref Range Status   08/06/2024 10:53 AM 12.1 (H) 4.4 - 11.3 x10*3/uL Final   06/17/2024 09:30 AM 8.0 4.4 - 11.3 x10*3/uL Final   03/13/2024 09:56 AM 9.7 4.4 - 11.3 x10*3/uL Final     nRBC   Date Value Ref Range Status   08/06/2024 0.0 0.0 - 0.0 /100 WBCs Final   06/17/2024 0.0 0.0 - 0.0 /100 WBCs Final   03/13/2024 0.0 0.0 - 0.0 /100 WBCs Final     RBC   Date Value Ref Range Status   08/06/2024 5.45 4.50 - 5.90 x10*6/uL Final   06/17/2024 5.54 4.50 - 5.90 x10*6/uL Final   03/13/2024 5.42 4.50 - 5.90 x10*6/uL Final     Hemoglobin   Date Value Ref Range Status   08/06/2024 17.1 13.5 - 17.5 g/dL Final   06/17/2024 17.1 13.5 - 17.5 g/dL Final   03/13/2024 17.0 13.5 - 17.5 g/dL Final     Hematocrit   Date Value Ref Range Status   08/06/2024 49.6 41.0 - 52.0 % Final   06/17/2024 50.8 41.0 - 52.0 % Final   03/13/2024 51.1 41.0 - 52.0 % Final     MCV   Date/Time Value Ref Range Status   08/06/2024 10:53 AM 91 80 - 100 fL Final   06/17/2024 09:30 AM 92 80 - 100 fL Final   03/13/2024 09:56 AM 94 80 - 100 fL Final     MCH   Date/Time Value Ref Range Status   08/06/2024 10:53 AM 31.4 26.0 - 34.0 pg Final   06/17/2024 09:30 AM 30.9 26.0 - 34.0 pg Final   03/13/2024 09:56 AM 31.4 26.0 - 34.0 pg Final     MCHC   Date/Time Value Ref Range Status   08/06/2024 10:53 AM 34.5 32.0 - 36.0 g/dL Final   06/17/2024 09:30 AM 33.7 32.0 - 36.0 g/dL Final   03/13/2024 09:56 AM 33.3 32.0 - 36.0 g/dL Final     RDW   Date/Time  Value Ref Range Status   08/06/2024 10:53 AM 13.3 11.5 - 14.5 % Final   06/17/2024 09:30 AM 14.0 11.5 - 14.5 % Final   03/13/2024 09:56 AM 13.2 11.5 - 14.5 % Final     Platelets   Date/Time Value Ref Range Status   08/06/2024 10:53  150 - 450 x10*3/uL Final   06/17/2024 09:30  150 - 450 x10*3/uL Final   03/13/2024 09:56  150 - 450 x10*3/uL Final     MPV   Date/Time Value Ref Range Status   08/14/2023 12:53 PM 10.7 7.0 - 12.6 CU Final   05/21/2023 10:14 AM 10.8 7.0 - 12.6 CU Final   05/08/2023 02:34 PM 11.1 7.0 - 12.6 CU Final     Neutrophils %   Date/Time Value Ref Range Status   08/06/2024 10:53 AM 82.8 40.0 - 80.0 % Final   06/17/2024 09:30 AM 73.8 40.0 - 80.0 % Final   03/13/2024 09:56 AM 77.1 40.0 - 80.0 % Final     Immature Granulocytes %, Automated   Date/Time Value Ref Range Status   08/06/2024 10:53 AM 0.3 0.0 - 0.9 % Final     Comment:     Immature Granulocyte Count (IG) includes promyelocytes, myelocytes and metamyelocytes but does not include bands. Percent differential counts (%) should be interpreted in the context of the absolute cell counts (cells/UL).   06/17/2024 09:30 AM 0.2 0.0 - 0.9 % Final     Comment:     Immature Granulocyte Count (IG) includes promyelocytes, myelocytes and metamyelocytes but does not include bands. Percent differential counts (%) should be interpreted in the context of the absolute cell counts (cells/UL).   03/13/2024 09:56 AM 0.3 0.0 - 0.9 % Final     Comment:     Immature Granulocyte Count (IG) includes promyelocytes, myelocytes and metamyelocytes but does not include bands. Percent differential counts (%) should be interpreted in the context of the absolute cell counts (cells/UL).     Lymphocytes %   Date/Time Value Ref Range Status   08/06/2024 10:53 AM 8.8 13.0 - 44.0 % Final   06/17/2024 09:30 AM 15.2 13.0 - 44.0 % Final   03/13/2024 09:56 AM 12.3 13.0 - 44.0 % Final     Monocytes %   Date/Time Value Ref Range Status   08/06/2024 10:53 AM 5.5 2.0 -  10.0 % Final   06/17/2024 09:30 AM 8.0 2.0 - 10.0 % Final   03/13/2024 09:56 AM 7.5 2.0 - 10.0 % Final     Eosinophils %   Date/Time Value Ref Range Status   08/06/2024 10:53 AM 2.3 0.0 - 6.0 % Final   06/17/2024 09:30 AM 2.2 0.0 - 6.0 % Final   03/13/2024 09:56 AM 2.2 0.0 - 6.0 % Final     Basophils %   Date/Time Value Ref Range Status   08/06/2024 10:53 AM 0.3 0.0 - 2.0 % Final   06/17/2024 09:30 AM 0.6 0.0 - 2.0 % Final   03/13/2024 09:56 AM 0.6 0.0 - 2.0 % Final     Neutrophils Absolute   Date/Time Value Ref Range Status   08/06/2024 10:53 AM 9.98 (H) 1.60 - 5.50 x10*3/uL Final     Comment:     Percent differential counts (%) should be interpreted in the context of the absolute cell counts (cells/uL).   06/17/2024 09:30 AM 5.91 (H) 1.60 - 5.50 x10*3/uL Final     Comment:     Percent differential counts (%) should be interpreted in the context of the absolute cell counts (cells/uL).   03/13/2024 09:56 AM 7.45 (H) 1.60 - 5.50 x10*3/uL Final     Comment:     Percent differential counts (%) should be interpreted in the context of the absolute cell counts (cells/uL).     Immature Granulocytes Absolute, Automated   Date/Time Value Ref Range Status   08/06/2024 10:53 AM 0.04 0.00 - 0.50 x10*3/uL Final   06/17/2024 09:30 AM 0.02 0.00 - 0.50 x10*3/uL Final   03/13/2024 09:56 AM 0.03 0.00 - 0.50 x10*3/uL Final     Lymphocytes Absolute   Date/Time Value Ref Range Status   08/06/2024 10:53 AM 1.06 0.80 - 3.00 x10*3/uL Final   06/17/2024 09:30 AM 1.22 0.80 - 3.00 x10*3/uL Final   03/13/2024 09:56 AM 1.19 0.80 - 3.00 x10*3/uL Final     Monocytes Absolute   Date/Time Value Ref Range Status   08/06/2024 10:53 AM 0.66 0.05 - 0.80 x10*3/uL Final   06/17/2024 09:30 AM 0.64 0.05 - 0.80 x10*3/uL Final   03/13/2024 09:56 AM 0.73 0.05 - 0.80 x10*3/uL Final     Eosinophils Absolute   Date/Time Value Ref Range Status   08/06/2024 10:53 AM 0.28 0.00 - 0.40 x10*3/uL Final   06/17/2024 09:30 AM 0.18 0.00 - 0.40 x10*3/uL Final   03/13/2024  09:56 AM 0.21 0.00 - 0.40 x10*3/uL Final     Basophils Absolute   Date/Time Value Ref Range Status   08/06/2024 10:53 AM 0.04 0.00 - 0.10 x10*3/uL Final   06/17/2024 09:30 AM 0.05 0.00 - 0.10 x10*3/uL Final   03/13/2024 09:56 AM 0.06 0.00 - 0.10 x10*3/uL Final           Assessment/Plan    1. Auto immune hemolytic anemia  Relapsed after short course steroid but no issues since doing the long taper.   He  finished a 2nd course but was still hemolyzing and had to do longer course with slow wean... starting at 40mg Prednisone with a planned decline of 5mg/weekly assuming stable hemogram.  He has been off steroids  and CBC is normal today      He has some neuropathy, B12 normal 4/8/23.   Energy is improving.  He sustained fall on right knee without bruising or ecchymosis.      2. Comorbidities  DJD is limiting.  Recent fall now with walker.   BPH also limits his sleep   HTN noted- no new meds.   New systolic murmur will address with PCP in 3 days     Plan:    OV 6 months in [place], then space out to yearly visits     Diagnoses and all orders for this visit:  Autoimmune hemolytic anemia (Multi)  -     Clinic Appointment Request Follow up  -     Clinic Appointment Request Follow up  -     CBC and Auto Differential; Future  -     Lactate Dehydrogenase; Future  -     Haptoglobin; Future  -     Clinic Appointment Request; Future           Beulah Huynh PA-C

## 2024-08-06 NOTE — PROGRESS NOTES
Patient here for follow up visit Autoimmune hemolytic anemia.        Recent ER for knee pain. Patient admits to leaning forward to pick something up causing the fall and landing on the right knee. Pain to the knee continues but is improving slowly.     Patient here with Alanna caregiver assistant    Medications and Allergies reviewed and reconciled this visit.    Follow up per MD request.    Patient reports no availability and use of mychart, Reviewed this is a good place to communicate with the team as well as review labs and upcoming orders.      No barriers to education noted, patient agrees to current plan and verbalized understanding using teach back method.

## 2024-08-09 ENCOUNTER — OFFICE VISIT (OUTPATIENT)
Dept: PRIMARY CARE | Facility: CLINIC | Age: 89
End: 2024-08-09
Payer: MEDICARE

## 2024-08-09 VITALS
WEIGHT: 198 LBS | HEART RATE: 96 BPM | OXYGEN SATURATION: 96 % | HEIGHT: 69 IN | BODY MASS INDEX: 29.33 KG/M2 | TEMPERATURE: 96.7 F | DIASTOLIC BLOOD PRESSURE: 72 MMHG | SYSTOLIC BLOOD PRESSURE: 120 MMHG

## 2024-08-09 DIAGNOSIS — M25.561 ACUTE PAIN OF RIGHT KNEE: Primary | ICD-10-CM

## 2024-08-09 DIAGNOSIS — R01.1 MURMUR, CARDIAC: ICD-10-CM

## 2024-08-09 PROCEDURE — 99213 OFFICE O/P EST LOW 20 MIN: CPT | Performed by: LICENSED PRACTICAL NURSE

## 2024-08-09 PROCEDURE — 3074F SYST BP LT 130 MM HG: CPT | Performed by: LICENSED PRACTICAL NURSE

## 2024-08-09 PROCEDURE — 1125F AMNT PAIN NOTED PAIN PRSNT: CPT | Performed by: LICENSED PRACTICAL NURSE

## 2024-08-09 PROCEDURE — 1036F TOBACCO NON-USER: CPT | Performed by: LICENSED PRACTICAL NURSE

## 2024-08-09 PROCEDURE — 3078F DIAST BP <80 MM HG: CPT | Performed by: LICENSED PRACTICAL NURSE

## 2024-08-09 ASSESSMENT — PAIN SCALES - GENERAL: PAINLEVEL: 2

## 2024-08-09 NOTE — PROGRESS NOTES
[facility]: [facility]  PROGRESS NOTE      David Motta is a 89 y.o. male that is presenting today for Follow-up (Pt states that he fell and hurt his rt knee and it was swollen. Pt states that he was taking tylenol and Advil for the pain and swelling ).      Subjective   Pt is a 89yr male who presents to the office today for follow up on his ED visit related to right knee pain. Mr. Zafar has a PMH of HLD, HTN, and BPH. Mr. Motta presented to the ED on 7/31/24  shared that he bumped his knee which caused severe pain. Pts of the right knee showed no fracture, dislocation or destructive changes. There was noted moderate degenerative changes in the medial femoral tibial joint compartment and moderate size right knee joint effusion.     Today he shares that his pain is significantly improved with initial icing of the knee and alternating between Motrin and Tylenol. Mr. Dahl is pleased with the improvement in his left knee and request PT services at this time.       Review of Systems   Musculoskeletal:         Right knee pain      Objective   Vitals:    08/09/24 1002   BP: 120/72   Pulse: 96   Temp: 35.9 °C (96.7 °F)   SpO2: 96%      Body mass index is 28.83 kg/m².  Physical Exam  Vitals reviewed.   Constitutional:       General: He is not in acute distress.     Appearance: He is not ill-appearing or diaphoretic.   Cardiovascular:      Rate and Rhythm: Normal rate and regular rhythm.      Heart sounds: Murmur heard.   Pulmonary:      Effort: Pulmonary effort is normal. No respiratory distress.      Breath sounds: No stridor. No wheezing, rhonchi or rales.   Musculoskeletal:      Right lower leg: No deformity, lacerations, tenderness or bony tenderness. Edema present.      Comments: Trace supra patellar pouch effusion, no tenderness       Diagnostic Results   Lab Results   Component Value Date    GLUCOSE 156 (H) 06/17/2024    CALCIUM 9.3 06/17/2024     06/17/2024    K 4.0  "06/17/2024    CO2 27 06/17/2024    CL 99 06/17/2024    BUN 14 06/17/2024    CREATININE 1.10 06/17/2024     Lab Results   Component Value Date    ALT 15 06/17/2024    AST 13 06/17/2024    ALKPHOS 74 06/17/2024    BILITOT 1.0 06/17/2024     Lab Results   Component Value Date    WBC 12.1 (H) 08/06/2024    HGB 17.1 08/06/2024    HCT 49.6 08/06/2024    MCV 91 08/06/2024     08/06/2024     Lab Results   Component Value Date    CHOL 210 (H) 06/17/2024    CHOL 191 04/15/2022    CHOL 205 (H) 03/29/2021     Lab Results   Component Value Date    HDL 35.0 (L) 06/17/2024    HDL 40 (L) 04/15/2022    HDL 42 03/29/2021     Lab Results   Component Value Date    LDLCALC 124 06/17/2024    LDLCALC 130 04/15/2022    LDLCALC 137 (H) 03/29/2021     Lab Results   Component Value Date    TRIG 254 (H) 06/17/2024    TRIG 104 04/15/2022    TRIG 132 03/29/2021     No components found for: \"CHOLHDL\"  Lab Results   Component Value Date    HGBA1C 5.6 06/17/2024     Other labs not included in the list above were reviewed either before or during this encounter.    History    Past Medical History:   Diagnosis Date    Anxiety 09/14/2023    Autoimmune hemolytic anemia (Multi) 09/14/2023    Back pain of lumbar region with sciatica 09/14/2023    Benign prostatic hyperplasia 09/14/2023    Depression 09/14/2023    Gastroesophageal reflux disease without esophagitis 09/14/2023    Hematuria syndrome 09/14/2023    Hyperlipidemia 09/14/2023    Hypertension 09/14/2023    Lumbar spondylosis 09/14/2023    Neuropathy 09/14/2023    Osteoarthritis 09/14/2023    Overactive bladder 09/14/2023    Type 2 diabetes mellitus without complication, without long-term current use of insulin (Multi) 09/14/2023     Past Surgical History:   Procedure Laterality Date    APPENDECTOMY  2005    COLONOSCOPY  2007    also 2010    COLONOSCOPY  2018    EYE SURGERY  2019    KNEE SURGERY Left 2018    TOTAL HIP ARTHROPLASTY Left 2006    TOTAL HIP ARTHROPLASTY Right 2010    TRIGGER " FINGER RELEASE Right 2015     Family History   Problem Relation Name Age of Onset    No Known Problems Mother      No Known Problems Father      Other (High Blood Pressure) Other Family History     Cancer Other Family History      Social History     Socioeconomic History    Marital status:      Spouse name: Not on file    Number of children: Not on file    Years of education: Not on file    Highest education level: Not on file   Occupational History    Not on file   Tobacco Use    Smoking status: Former     Types: Cigarettes     Passive exposure: Past    Smokeless tobacco: Never   Vaping Use    Vaping status: Never Used   Substance and Sexual Activity    Alcohol use: Yes     Comment: occasional    Drug use: Never    Sexual activity: Not on file   Other Topics Concern    Not on file   Social History Narrative    Not on file     Social Determinants of Health     Financial Resource Strain: Not on file   Food Insecurity: Not on file   Transportation Needs: Not on file   Physical Activity: Not on file   Stress: Not on file   Social Connections: Not on file   Intimate Partner Violence: Not on file   Housing Stability: Not on file     Allergies   Allergen Reactions    Sulfa (Sulfonamide Antibiotics) Other    Levofloxacin Hives    Nitrofurantoin Monohyd/M-Cryst Itching    Atorvastatin Other    Dexamethasone Dizziness    Pravastatin Sodium Myalgia    Allopurinol Rash     Current Outpatient Medications on File Prior to Visit   Medication Sig Dispense Refill    amLODIPine (Norvasc) 5 mg tablet TAKE 1 TABLET BY MOUTH TWICE  DAILY 180 tablet 3    ascorbic acid (Vitamin C) 500 mg tablet Take by mouth.      cholecalciferol, vitamin D3, (VITAMIN D3 ORAL) Take 1 tablet by mouth once daily.      finasteride (Proscar) 5 mg tablet Take 1 tablet (5 mg) by mouth once daily.      fluocinonide 0.05 % cream APPLY TOPICALLY TO THE AFFECTED AREA TWICE DAILY AS NEEDED FOR FLARES      lisinopril 20 mg tablet TAKE 1 TABLET BY MOUTH TWICE   DAILY 180 tablet 3    magnesium oxide-Mg AA chelate (Magnesium, oxide/AA chelate,) 300 mg capsule Take 1 capsule (300 mg) by mouth once daily.      NON FORMULARY Lithsone ( Brain Vitamin)      tamsulosin (Flomax) 0.4 mg 24 hr capsule Take 1 capsule (0.4 mg) by mouth. AT BEDTIME      traZODone (Desyrel) 50 mg tablet TAKE 1 TABLET BY MOUTH ONCE  DAILY AT BEDTIME 90 tablet 3    zinc gluconate 50 mg tablet Take 1 tablet (50 mg) by mouth once daily.      [DISCONTINUED] predniSONE (Deltasone) 10 mg tablet Take 4 tablets (40 mg) by mouth once daily for 180 days, THEN 4 tablets (40 mg) once daily. Take 40 mg daily for 4 days, then 30 mg daily for 4 days, then 20 mg daily for 4 days, then 10 mg daily for 4 days. (Patient not taking: Reported on 2/6/2024) 40 tablet 0     No current facility-administered medications on file prior to visit.     Immunization History   Administered Date(s) Administered    Flu vaccine, quadrivalent, high-dose, preservative free, age 65y+ (FLUZONE) 12/03/2021, 10/19/2022    Influenza, Unspecified 10/19/2022    Moderna SARS-CoV-2 Vaccination 04/16/2021, 05/13/2021    Pneumococcal conjugate vaccine, 13-valent (PREVNAR 13) 06/06/2016    Td vaccine, age 7 years and older (TDVAX) 07/03/2003, 04/23/2013    Tdap vaccine, age 7 year and older (BOOSTRIX, ADACEL) 02/03/2016    Zoster, live 05/06/2014     Patient's medical history was reviewed and updated either before or during this encounter.       Assessment/Plan   Problem List Items Addressed This Visit       Murmur, cardiac    Acute pain of right knee - Primary    Relevant Orders    PT eval and treat   Mr. Dahl is doing well. His VS are stable. His exam shows only trace edema to the knee and he reports significant improvement in his pain and ROM. He can continue alternating between motrin and tylenol prn pain. I will refer him to PT for additional strengthening based upon his Xray.     Finally there was an incidental murmur noted on auscultation.  Pt reports being told at a recent MD visit that a murmur was noted. He remains asymptomatic reporting no SOB, CP, fatigue or bilateral lower leg swelling. He prefers to wait until he is seen by his PCP, Dr Lock, to address this concern. No follow up needed to our office.     Marlee Juarez, APRN-CNP

## 2024-10-08 ENCOUNTER — LAB (OUTPATIENT)
Dept: LAB | Facility: LAB | Age: 89
End: 2024-10-08
Payer: MEDICARE

## 2024-10-08 DIAGNOSIS — Z01.89 ENCOUNTER FOR ROUTINE LABORATORY TESTING: ICD-10-CM

## 2024-10-08 DIAGNOSIS — E78.2 MIXED HYPERLIPIDEMIA: ICD-10-CM

## 2024-10-08 DIAGNOSIS — E11.9 TYPE 2 DIABETES MELLITUS WITHOUT COMPLICATION, WITHOUT LONG-TERM CURRENT USE OF INSULIN (MULTI): ICD-10-CM

## 2024-10-08 DIAGNOSIS — I10 PRIMARY HYPERTENSION: ICD-10-CM

## 2024-10-08 LAB
ALBUMIN SERPL BCP-MCNC: 4.3 G/DL (ref 3.4–5)
ALP SERPL-CCNC: 57 U/L (ref 33–136)
ALT SERPL W P-5'-P-CCNC: 12 U/L (ref 10–52)
ANION GAP SERPL CALCULATED.3IONS-SCNC: 13 MMOL/L (ref 10–20)
AST SERPL W P-5'-P-CCNC: 12 U/L (ref 9–39)
BASOPHILS # BLD AUTO: 0.04 X10*3/UL (ref 0–0.1)
BASOPHILS NFR BLD AUTO: 0.5 %
BILIRUB SERPL-MCNC: 1 MG/DL (ref 0–1.2)
BUN SERPL-MCNC: 22 MG/DL (ref 6–23)
CALCIUM SERPL-MCNC: 9.3 MG/DL (ref 8.6–10.3)
CHLORIDE SERPL-SCNC: 100 MMOL/L (ref 98–107)
CO2 SERPL-SCNC: 30 MMOL/L (ref 21–32)
CREAT SERPL-MCNC: 1.03 MG/DL (ref 0.5–1.3)
EGFRCR SERPLBLD CKD-EPI 2021: 70 ML/MIN/1.73M*2
EOSINOPHIL # BLD AUTO: 0.16 X10*3/UL (ref 0–0.4)
EOSINOPHIL NFR BLD AUTO: 2 %
ERYTHROCYTE [DISTWIDTH] IN BLOOD BY AUTOMATED COUNT: 14 % (ref 11.5–14.5)
EST. AVERAGE GLUCOSE BLD GHB EST-MCNC: 103 MG/DL
GLUCOSE SERPL-MCNC: 221 MG/DL (ref 74–99)
HBA1C MFR BLD: 5.2 %
HCT VFR BLD AUTO: 45.6 % (ref 41–52)
HGB BLD-MCNC: 16.1 G/DL (ref 13.5–17.5)
IMM GRANULOCYTES # BLD AUTO: 0.03 X10*3/UL (ref 0–0.5)
IMM GRANULOCYTES NFR BLD AUTO: 0.4 % (ref 0–0.9)
LYMPHOCYTES # BLD AUTO: 0.87 X10*3/UL (ref 0.8–3)
LYMPHOCYTES NFR BLD AUTO: 10.9 %
MCH RBC QN AUTO: 32.5 PG (ref 26–34)
MCHC RBC AUTO-ENTMCNC: 35.3 G/DL (ref 32–36)
MCV RBC AUTO: 92 FL (ref 80–100)
MONOCYTES # BLD AUTO: 0.5 X10*3/UL (ref 0.05–0.8)
MONOCYTES NFR BLD AUTO: 6.3 %
NEUTROPHILS # BLD AUTO: 6.36 X10*3/UL (ref 1.6–5.5)
NEUTROPHILS NFR BLD AUTO: 79.9 %
NRBC BLD-RTO: 0 /100 WBCS (ref 0–0)
PLATELET # BLD AUTO: 202 X10*3/UL (ref 150–450)
POTASSIUM SERPL-SCNC: 3.9 MMOL/L (ref 3.5–5.3)
PROT SERPL-MCNC: 6.2 G/DL (ref 6.4–8.2)
RBC # BLD AUTO: 4.95 X10*6/UL (ref 4.5–5.9)
SODIUM SERPL-SCNC: 139 MMOL/L (ref 136–145)
WBC # BLD AUTO: 8 X10*3/UL (ref 4.4–11.3)

## 2024-10-08 PROCEDURE — 85025 COMPLETE CBC W/AUTO DIFF WBC: CPT

## 2024-10-08 PROCEDURE — 36415 COLL VENOUS BLD VENIPUNCTURE: CPT

## 2024-10-08 PROCEDURE — 83036 HEMOGLOBIN GLYCOSYLATED A1C: CPT

## 2024-10-08 PROCEDURE — 80053 COMPREHEN METABOLIC PANEL: CPT

## 2024-10-10 NOTE — PROGRESS NOTES
[facility]: [facility]  MEDICARE WELLNESS EXAM      David Motta is a 89 y.o. male that is presenting today for cpe.    Assessment/Plan    Diagnoses and all orders for this visit:  Annual physical exam  Type 2 diabetes mellitus without complication, without long-term current use of insulin (Multi)  Lumbar spondylosis  Osteoarthritis, unspecified osteoarthritis type, unspecified site  Kidney stone  Primary hypertension  -     ECG 12 lead (Clinic Performed)  -     hydroCHLOROthiazide (HYDRODiuril) 25 mg tablet; Take 1 tablet (25 mg) by mouth once daily.  Mixed hyperlipidemia  Chronic gout without tophus, unspecified cause, unspecified site  Depression, unspecified depression type  Heart murmur  -     Transthoracic Echo (TTE) Complete; Future  Dyspnea, unspecified type  -     XR chest 2 views; Future  Other orders  -     Follow Up In Primary Care - Medicare Annual  -     Flu vaccine, trivalent, preservative free, HIGH-DOSE, age 65y+ (Fluzone)  -     perflutren lipid microspheres (Definity) injection 0.5-10 mL of dilution  -     sulfur hexafluoride microsphr (Lumason) injection 24.28 mg  -     perflutren protein A microsphere (Optison) injection 0.5 mL  -     Follow Up In Primary Care - Established; Future  -     Pneumococcal conjugate vaccine, 20-valent (PREVNAR 20)  We completed the medicare wellness exam today.  The lifestyle is reviewed and recommendations for diet and exercise are made. We reviewed the immunizations and cancer screening and recommendations for needed immunizations and screenings are made.  The patient is independent in all ADL, shows no clinical signs of cognitive impairment, and is not falling or at risk for such.     In terms of AWV elements a few important points:  1.  Advanced directives-he had a power of  for healthcare.  Her name is Genesis.  She actually is here for today's visit  2.  Cancer screenings-not pertinent at his age  3.  Immunizations-he had Tdap in  2016 he had Zostavax in 2014 and his last pneumonia vaccine was in 2016.  He is going to get a flu shot and a Pneumovax today.    In terms of his chronic medical problems we did review his recent blood work.  Here are a few important points  1.  Diabetes-under good control.  2.  Hypertension-not well-controlled despite amlodipine 5 mg daily and lisinopril 20 mg twice a da  I do not want to increase the amlodipine because this will promote leg swelling.  As a result we are just going to add some hydrochlorothiazide.  His heart rate sounds a little irregular today so we are going to do an EKG I am going to get an echocardiogram.  3.  Hyperlipidemia-stable  4.  Gout-clinically stable.    I did note that he has this problem with his back and leg it sounds like he may have a lumbar radiculopathy.  We are going to see if this resolves itself over the next few weeks as we will be seeing him back anyway and we will go from there  ADVANCED CARE PLANNING  Advanced Care Planning was discussed with patient:  The patient has an active advanced care plan on file. The patient has an active surrogate decision-maker on file.  Encouraged the patient to confirm that Living Will and Healthcare Power of  (HCPoA) are accurate and up to date.  Encouraged the patient to confirm that our office be provided a copy of any documentation in the event that anything changes.    ACTIVITIES OF DAILY LIVING  Basic ADLs:  Bathing: Independent, Dressing: Independent, Toileting: Independent, Transferring: Independent, Continence: Independent, Feeding: Independent.    Instrumental ADLs:  Ability to use phone: Independent, Shopping: Independent, Cooking: Independent, House-keeping: Independent, Laundry: Independent, Transportation: Independent, Medication Management: Independent, Finance Management: Independent.    Nito Santos is here for his annual wellness visit and follow-up for his chronic medical problems.  He has not been feeling his  best.  He has been having some problems with some leg swelling noticing that his blood pressures are higher than previously and sometimes is a little bit short of breath.  Is not having any chest pain is not having any palpitations.  He also reports that has been having pain in his back and down his right leg which seems to be exacerbated after he was seeing physical therapy and doing some unusual stretches.      Review of Systems   Respiratory:  Positive for shortness of breath. Negative for cough.    Cardiovascular:  Positive for leg swelling. Negative for chest pain and palpitations.   Gastrointestinal: Negative.    Musculoskeletal:  Positive for arthralgias and back pain.        Complains of pain in th eback and down the right leg - began after started going to PT 2 weeks ago     Objective   Vitals:    10/11/24 1139   BP: (!) 150/98   Pulse: 80   Temp: 36.4 °C (97.5 °F)   SpO2: 96%      Body mass index is 28.94 kg/m².  Physical Exam  Cardiovascular:      Rate and Rhythm: Normal rate and regular rhythm.      Pulses: Normal pulses.      Heart sounds: Murmur heard.      Comments: 2/6 systolic murmur aortic area left sternal border  Pulmonary:      Effort: Pulmonary effort is normal.      Breath sounds: Normal breath sounds.   Abdominal:      General: Abdomen is flat. Bowel sounds are normal.      Palpations: Abdomen is soft.   Musculoskeletal:         General: Normal range of motion.   Skin:     General: Skin is warm and dry.   Neurological:      General: No focal deficit present.      Mental Status: Mental status is at baseline.       Diagnostic Results   Lab Results   Component Value Date    GLUCOSE 221 (H) 10/08/2024    CALCIUM 9.3 10/08/2024     10/08/2024    K 3.9 10/08/2024    CO2 30 10/08/2024     10/08/2024    BUN 22 10/08/2024    CREATININE 1.03 10/08/2024     Lab Results   Component Value Date    ALT 12 10/08/2024    AST 12 10/08/2024    ALKPHOS 57 10/08/2024    BILITOT 1.0 10/08/2024     Lab  "Results   Component Value Date    WBC 8.0 10/08/2024    HGB 16.1 10/08/2024    HCT 45.6 10/08/2024    MCV 92 10/08/2024     10/08/2024     Lab Results   Component Value Date    CHOL 210 (H) 06/17/2024    CHOL 191 04/15/2022    CHOL 205 (H) 03/29/2021     Lab Results   Component Value Date    HDL 35.0 (L) 06/17/2024    HDL 40 (L) 04/15/2022    HDL 42 03/29/2021     Lab Results   Component Value Date    LDLCALC 124 06/17/2024    LDLCALC 130 04/15/2022    LDLCALC 137 (H) 03/29/2021     Lab Results   Component Value Date    TRIG 254 (H) 06/17/2024    TRIG 104 04/15/2022    TRIG 132 03/29/2021     No components found for: \"CHOLHDL\"  Lab Results   Component Value Date    HGBA1C 5.2 10/08/2024     Other labs not included in the list above reviewed either before or during this encounter.    History   Past Medical History:   Diagnosis Date    Anxiety 09/14/2023    Autoimmune hemolytic anemia (Multi) 09/14/2023    Back pain of lumbar region with sciatica 09/14/2023    Benign prostatic hyperplasia 09/14/2023    Depression 09/14/2023    Gastroesophageal reflux disease without esophagitis 09/14/2023    Hematuria syndrome 09/14/2023    Hyperlipidemia 09/14/2023    Hypertension 09/14/2023    Lumbar spondylosis 09/14/2023    Neuropathy 09/14/2023    Osteoarthritis 09/14/2023    Overactive bladder 09/14/2023    Type 2 diabetes mellitus without complication, without long-term current use of insulin (Multi) 09/14/2023     Past Surgical History:   Procedure Laterality Date    APPENDECTOMY  2005    COLONOSCOPY  2007    also 2010    COLONOSCOPY  2018    EYE SURGERY  2019    KNEE SURGERY Left 2018    TOTAL HIP ARTHROPLASTY Left 2006    TOTAL HIP ARTHROPLASTY Right 2010    TRIGGER FINGER RELEASE Right 2015     Family History   Problem Relation Name Age of Onset    No Known Problems Mother      No Known Problems Father      Other (High Blood Pressure) Other Family History     Cancer Other Family History      Social History "     Socioeconomic History    Marital status:      Spouse name: Not on file    Number of children: Not on file    Years of education: Not on file    Highest education level: Not on file   Occupational History    Not on file   Tobacco Use    Smoking status: Former     Types: Cigarettes     Passive exposure: Past    Smokeless tobacco: Never   Vaping Use    Vaping status: Never Used   Substance and Sexual Activity    Alcohol use: Yes     Comment: occasional    Drug use: Never    Sexual activity: Not on file   Other Topics Concern    Not on file   Social History Narrative    Not on file     Social Determinants of Health     Financial Resource Strain: Not on file   Food Insecurity: Not on file   Transportation Needs: Not on file   Physical Activity: Not on file   Stress: Not on file   Social Connections: Not on file   Intimate Partner Violence: Not on file   Housing Stability: Not on file     Allergies   Allergen Reactions    Sulfa (Sulfonamide Antibiotics) Other    Levofloxacin Hives    Nitrofurantoin Monohyd/M-Cryst Itching    Atorvastatin Other    Dexamethasone Dizziness    Gabapentin Other     Daughter states patient is allergic    Pravastatin Sodium Myalgia    Allopurinol Rash     Current Outpatient Medications on File Prior to Visit   Medication Sig Dispense Refill    amLODIPine (Norvasc) 5 mg tablet TAKE 1 TABLET BY MOUTH TWICE  DAILY (Patient taking differently: Take 1 tablet (5 mg) by mouth once daily.) 180 tablet 3    ascorbic acid (Vitamin C) 500 mg tablet Take by mouth.      cholecalciferol, vitamin D3, (VITAMIN D3 ORAL) Take 1 tablet by mouth once daily.      finasteride (Proscar) 5 mg tablet Take 1 tablet (5 mg) by mouth once daily. Do not crush, chew, or split.      fluocinonide 0.05 % cream APPLY TOPICALLY TO THE AFFECTED AREA TWICE DAILY AS NEEDED FOR FLARES      lisinopril 20 mg tablet TAKE 1 TABLET BY MOUTH TWICE  DAILY 180 tablet 3    magnesium oxide-Mg AA chelate (Magnesium, oxide/AA chelate,)  300 mg capsule Take 1 capsule (300 mg) by mouth once daily.      NON FORMULARY Lithsone ( Brain Vitamin)      tamsulosin (Flomax) 0.4 mg 24 hr capsule Take 1 capsule (0.4 mg) by mouth. AT BEDTIME      traZODone (Desyrel) 50 mg tablet TAKE 1 TABLET BY MOUTH ONCE  DAILY AT BEDTIME 90 tablet 3    zinc gluconate 50 mg tablet Take 1 tablet (50 mg) by mouth once daily.       No current facility-administered medications on file prior to visit.     Immunization History   Administered Date(s) Administered    Flu vaccine, quadrivalent, high-dose, preservative free, age 65y+ (FLUZONE) 12/03/2021, 10/19/2022    Influenza, Unspecified 10/19/2022    Moderna SARS-CoV-2 Vaccination 04/16/2021, 05/13/2021    Pneumococcal conjugate vaccine, 13-valent (PREVNAR 13) 06/06/2016    Td vaccine, age 7 years and older (TDVAX) 07/03/2003, 04/23/2013    Tdap vaccine, age 7 year and older (BOOSTRIX, ADACEL) 02/03/2016    Zoster, live 05/06/2014     Patient's medical history was reviewed and updated either before or during this encounter.     Mohan Lock MD

## 2024-10-11 ENCOUNTER — HOSPITAL ENCOUNTER (OUTPATIENT)
Dept: RADIOLOGY | Facility: CLINIC | Age: 89
Discharge: HOME | End: 2024-10-11
Payer: MEDICARE

## 2024-10-11 ENCOUNTER — OFFICE VISIT (OUTPATIENT)
Dept: PRIMARY CARE | Facility: CLINIC | Age: 89
End: 2024-10-11
Payer: MEDICARE

## 2024-10-11 ENCOUNTER — TELEPHONE (OUTPATIENT)
Dept: PRIMARY CARE | Facility: CLINIC | Age: 89
End: 2024-10-11

## 2024-10-11 VITALS
TEMPERATURE: 97.5 F | SYSTOLIC BLOOD PRESSURE: 150 MMHG | DIASTOLIC BLOOD PRESSURE: 98 MMHG | HEART RATE: 80 BPM | WEIGHT: 196 LBS | HEIGHT: 69 IN | OXYGEN SATURATION: 96 % | BODY MASS INDEX: 29.03 KG/M2

## 2024-10-11 DIAGNOSIS — E78.2 MIXED HYPERLIPIDEMIA: ICD-10-CM

## 2024-10-11 DIAGNOSIS — F32.A DEPRESSION, UNSPECIFIED DEPRESSION TYPE: ICD-10-CM

## 2024-10-11 DIAGNOSIS — M47.816 LUMBAR SPONDYLOSIS: ICD-10-CM

## 2024-10-11 DIAGNOSIS — R01.1 HEART MURMUR: ICD-10-CM

## 2024-10-11 DIAGNOSIS — I10 PRIMARY HYPERTENSION: ICD-10-CM

## 2024-10-11 DIAGNOSIS — M19.90 OSTEOARTHRITIS, UNSPECIFIED OSTEOARTHRITIS TYPE, UNSPECIFIED SITE: ICD-10-CM

## 2024-10-11 DIAGNOSIS — R06.00 DYSPNEA, UNSPECIFIED TYPE: ICD-10-CM

## 2024-10-11 DIAGNOSIS — Z00.00 ANNUAL PHYSICAL EXAM: Primary | ICD-10-CM

## 2024-10-11 DIAGNOSIS — E11.9 TYPE 2 DIABETES MELLITUS WITHOUT COMPLICATION, WITHOUT LONG-TERM CURRENT USE OF INSULIN (MULTI): ICD-10-CM

## 2024-10-11 DIAGNOSIS — M1A.9XX0 CHRONIC GOUT WITHOUT TOPHUS, UNSPECIFIED CAUSE, UNSPECIFIED SITE: ICD-10-CM

## 2024-10-11 DIAGNOSIS — N20.0 KIDNEY STONE: ICD-10-CM

## 2024-10-11 PROCEDURE — G0008 ADMIN INFLUENZA VIRUS VAC: HCPCS | Performed by: INTERNAL MEDICINE

## 2024-10-11 PROCEDURE — 71046 X-RAY EXAM CHEST 2 VIEWS: CPT

## 2024-10-11 PROCEDURE — 99214 OFFICE O/P EST MOD 30 MIN: CPT | Performed by: INTERNAL MEDICINE

## 2024-10-11 PROCEDURE — 99215 OFFICE O/P EST HI 40 MIN: CPT | Performed by: INTERNAL MEDICINE

## 2024-10-11 PROCEDURE — G0009 ADMIN PNEUMOCOCCAL VACCINE: HCPCS | Performed by: INTERNAL MEDICINE

## 2024-10-11 RX ORDER — FINASTERIDE 5 MG/1
5 TABLET, FILM COATED ORAL DAILY
COMMUNITY

## 2024-10-11 RX ORDER — HYDROCHLOROTHIAZIDE 25 MG/1
25 TABLET ORAL DAILY
Qty: 30 TABLET | Refills: 5 | Status: SHIPPED | OUTPATIENT
Start: 2024-10-11 | End: 2025-04-09

## 2024-10-11 ASSESSMENT — ENCOUNTER SYMPTOMS
ARTHRALGIAS: 1
SHORTNESS OF BREATH: 1
DEPRESSION: 0
OCCASIONAL FEELINGS OF UNSTEADINESS: 1
LOSS OF SENSATION IN FEET: 0
BACK PAIN: 1
PALPITATIONS: 0
GASTROINTESTINAL NEGATIVE: 1
COUGH: 0

## 2024-10-11 ASSESSMENT — PAIN SCALES - GENERAL: PAINLEVEL: 5

## 2024-10-11 ASSESSMENT — LIFESTYLE VARIABLES
AUDIT TOTAL SCORE: 3
AUDIT-C TOTAL SCORE: 3
HOW OFTEN DURING THE LAST YEAR HAVE YOU HAD A FEELING OF GUILT OR REMORSE AFTER DRINKING: NEVER
HOW OFTEN DO YOU HAVE SIX OR MORE DRINKS ON ONE OCCASION: NEVER
HOW OFTEN DURING THE LAST YEAR HAVE YOU NEEDED AN ALCOHOLIC DRINK FIRST THING IN THE MORNING TO GET YOURSELF GOING AFTER A NIGHT OF HEAVY DRINKING: NEVER
HOW MANY STANDARD DRINKS CONTAINING ALCOHOL DO YOU HAVE ON A TYPICAL DAY: 1 OR 2
HOW OFTEN DURING THE LAST YEAR HAVE YOU FAILED TO DO WHAT WAS NORMALLY EXPECTED FROM YOU BECAUSE OF DRINKING: NEVER
HOW OFTEN DURING THE LAST YEAR HAVE YOU BEEN UNABLE TO REMEMBER WHAT HAPPENED THE NIGHT BEFORE BECAUSE YOU HAD BEEN DRINKING: NEVER
SKIP TO QUESTIONS 9-10: 1
HOW OFTEN DO YOU HAVE A DRINK CONTAINING ALCOHOL: 2-3 TIMES A WEEK
HAVE YOU OR SOMEONE ELSE BEEN INJURED AS A RESULT OF YOUR DRINKING: NO
HOW OFTEN DURING THE LAST YEAR HAVE YOU FOUND THAT YOU WERE NOT ABLE TO STOP DRINKING ONCE YOU HAD STARTED: NEVER
HAS A RELATIVE, FRIEND, DOCTOR, OR ANOTHER HEALTH PROFESSIONAL EXPRESSED CONCERN ABOUT YOUR DRINKING OR SUGGESTED YOU CUT DOWN: NO

## 2024-10-11 NOTE — PATIENT INSTRUCTIONS
We took care of a few things today as you know.  1.  You had your flu shot and your pneumonia vaccine.  2.  Advanced directives-you affirmed that [employer] is your power of  for healthcare.  3.  Cancer screenings-not pertinent at your age  4.  Immunizations-interestingly there are other vaccines that you are also overdue for at your age.  You had the old shingles vaccine and you should have had the new shingles vaccine called Shingrix.  You also are over age 75 and they now recommend RSV vaccine for your age group.  On the good side your next tetanus shot will be due until 2026    Trend your chronic medical issues we need to make a few adjustments today  Lets add hydrochlorothiazide 25 mg daily to your regimen to help with the leg swelling and your blood pressure.  I ordered an EKG today  I ordered an echocardiogram today to recheck your heart to see how your heart muscle is and to check out your heart valves.  I ordered a chest x-ray as well because of some of the mild shortness of breath.    I will plan on seeing you back in a month unless you need me sooner I of course will let you know about your stuff as it comes back

## 2024-10-24 ENCOUNTER — HOSPITAL ENCOUNTER (OUTPATIENT)
Dept: CARDIOLOGY | Facility: CLINIC | Age: 89
Discharge: HOME | End: 2024-10-24
Payer: MEDICARE

## 2024-10-24 DIAGNOSIS — R01.1 HEART MURMUR: ICD-10-CM

## 2024-10-24 PROCEDURE — 93306 TTE W/DOPPLER COMPLETE: CPT

## 2024-10-24 PROCEDURE — 93306 TTE W/DOPPLER COMPLETE: CPT | Performed by: INTERNAL MEDICINE

## 2024-10-27 LAB
AORTIC VALVE PEAK VELOCITY: 1.38 M/S
AV PEAK GRADIENT: 7.7 MMHG
AVA (PEAK VEL): 2.21 CM2
EJECTION FRACTION APICAL 4 CHAMBER: 60.1
EJECTION FRACTION: 65 %
LEFT ATRIUM VOLUME AREA LENGTH INDEX BSA: 33.5 ML/M2
LEFT VENTRICLE INTERNAL DIMENSION DIASTOLE: 4.33 CM (ref 3.5–6)
LEFT VENTRICULAR OUTFLOW TRACT DIAMETER: 1.96 CM
MITRAL VALVE E/A RATIO: 0.58
RIGHT VENTRICLE FREE WALL PEAK S': 14.51 CM/S
TRICUSPID ANNULAR PLANE SYSTOLIC EXCURSION: 2.1 CM

## 2024-10-29 ENCOUNTER — TELEPHONE (OUTPATIENT)
Dept: PRIMARY CARE | Facility: CLINIC | Age: 89
End: 2024-10-29
Payer: MEDICARE

## 2024-11-22 ENCOUNTER — APPOINTMENT (OUTPATIENT)
Dept: PRIMARY CARE | Facility: CLINIC | Age: 89
End: 2024-11-22
Payer: MEDICARE

## 2024-11-22 ENCOUNTER — OFFICE VISIT (OUTPATIENT)
Dept: CARDIOLOGY | Facility: CLINIC | Age: 89
End: 2024-11-22
Payer: MEDICARE

## 2024-11-22 VITALS
BODY MASS INDEX: 29.44 KG/M2 | SYSTOLIC BLOOD PRESSURE: 148 MMHG | HEART RATE: 84 BPM | OXYGEN SATURATION: 93 % | HEIGHT: 69 IN | WEIGHT: 198.8 LBS | DIASTOLIC BLOOD PRESSURE: 80 MMHG

## 2024-11-22 DIAGNOSIS — I10 HYPERTENSION, UNSPECIFIED TYPE: ICD-10-CM

## 2024-11-22 DIAGNOSIS — E78.5 HYPERLIPIDEMIA, UNSPECIFIED HYPERLIPIDEMIA TYPE: Primary | ICD-10-CM

## 2024-11-22 PROCEDURE — 1126F AMNT PAIN NOTED NONE PRSNT: CPT | Performed by: INTERNAL MEDICINE

## 2024-11-22 PROCEDURE — 1160F RVW MEDS BY RX/DR IN RCRD: CPT | Performed by: INTERNAL MEDICINE

## 2024-11-22 PROCEDURE — 99204 OFFICE O/P NEW MOD 45 MIN: CPT | Performed by: INTERNAL MEDICINE

## 2024-11-22 PROCEDURE — 1159F MED LIST DOCD IN RCRD: CPT | Performed by: INTERNAL MEDICINE

## 2024-11-22 PROCEDURE — 3077F SYST BP >= 140 MM HG: CPT | Performed by: INTERNAL MEDICINE

## 2024-11-22 PROCEDURE — 3079F DIAST BP 80-89 MM HG: CPT | Performed by: INTERNAL MEDICINE

## 2024-11-22 PROCEDURE — 99214 OFFICE O/P EST MOD 30 MIN: CPT | Performed by: INTERNAL MEDICINE

## 2024-11-22 PROCEDURE — 1036F TOBACCO NON-USER: CPT | Performed by: INTERNAL MEDICINE

## 2024-11-22 RX ORDER — ASPIRIN 81 MG/1
81 TABLET ORAL DAILY
Qty: 90 TABLET | Refills: 3 | Status: SHIPPED | OUTPATIENT
Start: 2024-11-22 | End: 2025-11-22

## 2024-11-22 RX ORDER — LISINOPRIL 20 MG/1
20 TABLET ORAL DAILY
Qty: 180 TABLET | Refills: 3 | Status: SHIPPED | OUTPATIENT
Start: 2024-11-22

## 2024-11-22 RX ORDER — METOPROLOL SUCCINATE 50 MG/1
50 TABLET, EXTENDED RELEASE ORAL DAILY
Qty: 90 TABLET | Refills: 3 | Status: SHIPPED | OUTPATIENT
Start: 2024-11-22 | End: 2025-11-22

## 2024-11-22 RX ORDER — FUROSEMIDE 20 MG/1
20 TABLET ORAL DAILY
Qty: 90 TABLET | Refills: 3 | Status: SHIPPED | OUTPATIENT
Start: 2024-11-22 | End: 2025-11-22

## 2024-11-22 RX ORDER — ROSUVASTATIN CALCIUM 20 MG/1
20 TABLET, COATED ORAL DAILY
Qty: 90 TABLET | Refills: 3 | Status: SHIPPED | OUTPATIENT
Start: 2024-11-22 | End: 2025-11-22

## 2024-11-22 ASSESSMENT — PAIN SCALES - GENERAL: PAINLEVEL_OUTOF10: 0-NO PAIN

## 2024-12-12 NOTE — PROGRESS NOTES
Primary Care Physician: Mohan Lock MD  Date of Visit: 11/22/2024 11:30 AM EST  Location of visit: [facility] [place] [address]     Chief Complaint:   Chief Complaint   Patient presents with    new pt    Consult     HPI / Summary:   David Motta is a 89 y.o. 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  Surgical Hx:   He has a past surgical history that includes Total hip arthroplasty (Left, 2006); Colonoscopy (2007); Appendectomy (2005); Total hip arthroplasty (Right, 2010); Trigger finger release (Right, 2015); Colonoscopy (2018); Knee surgery (Left, 2018); and Eye surgery (2019).   Social Hx:   He reports that he has quit smoking. His smoking use included cigarettes. He has been exposed to tobacco smoke. He has never used smokeless tobacco. He reports current alcohol use. He reports that he does not use drugs.  Family Hx:   His family history includes Cancer in an other family member; High Blood Pressure in an other family member; No Known Problems in his father and mother.   Allergies:  Allergies   Allergen Reactions    Sulfa (Sulfonamide Antibiotics) Other    Levofloxacin Hives    Nitrofurantoin Monohyd/M-Cryst Itching    Atorvastatin Other    Dexamethasone Dizziness    Gabapentin Other     Daughter states patient is allergic    Pravastatin Sodium Myalgia    Allopurinol Rash     Outpatient  "Medications:  Current Outpatient Medications   Medication Instructions    amLODIPine (NORVASC) 5 mg, oral, 2 times daily    ascorbic acid (Vitamin C) 500 mg tablet Take by mouth.    aspirin 81 mg, oral, Daily    cholecalciferol, vitamin D3, (VITAMIN D3 ORAL) 1 tablet, Daily    finasteride (PROSCAR) 5 mg, Daily    fluocinonide 0.05 % cream APPLY TOPICALLY TO THE AFFECTED AREA TWICE DAILY AS NEEDED FOR FLARES    furosemide (LASIX) 20 mg, oral, Daily    lisinopril 20 mg, oral, Daily    magnesium oxide-Mg AA chelate (Magnesium, oxide/AA chelate,) 300 mg capsule 1 capsule, Daily    metoprolol succinate XL (TOPROL-XL) 50 mg, oral, Daily, Do not crush or chew.    NON FORMULARY Lithsone ( Brain Vitamin)    rosuvastatin (CRESTOR) 20 mg, oral, Daily    tamsulosin (FLOMAX) 0.4 mg    traZODone (DESYREL) 50 mg, oral, Nightly    zinc gluconate 50 mg tablet 1 tablet, Daily     Physical Exam:  Vitals:    11/22/24 1143 11/22/24 1234   BP: 153/84 148/80   BP Location: Left arm    Patient Position: Sitting    BP Cuff Size: Large adult    Pulse: 93 84   SpO2: 93%    Weight: 90.2 kg (198 lb 12.8 oz)    Height: 1.753 m (5' 9\")      Wt Readings from Last 5 Encounters:   11/22/24 90.2 kg (198 lb 12.8 oz)   10/11/24 88.9 kg (196 lb)   08/09/24 89.8 kg (198 lb)   08/06/24 87.1 kg (192 lb 2.1 oz)   06/19/24 88 kg (194 lb)     Physical Exam  JVP not elevated. Carotid impulses are 2+ without overlying bruit.   Chest exhibits fair to good air movement with completely clear breath sounds.   The cardiac rhythm is regular with no premature beats.   Normal S1 and S2. No gallop, murmur or rub, or click.   Abdomen is soft and benign without focal tenderness.   With no lower leg edema. The pedal pulses are intact.     Last Labs:  Hospital Outpatient Visit on 10/24/2024   Component Date Value    AV pk silva 10/24/2024 1.38     LVOT diam 10/24/2024 1.96     MV E/A ratio 10/24/2024 0.58     LA vol index A/L 10/24/2024 33.5     Tricuspid annular plane * " 10/24/2024 2.1     LV EF 10/24/2024 65     RV free wall pk S' 10/24/2024 14.51     LVIDd 10/24/2024 4.33     Aortic Valve Area by Con* 10/24/2024 2.21     AV pk grad 10/24/2024 7.7     LV A4C EF 10/24/2024 60.1    Lab on 10/08/2024   Component Date Value    Glucose 10/08/2024 221 (H)     Sodium 10/08/2024 139     Potassium 10/08/2024 3.9     Chloride 10/08/2024 100     Bicarbonate 10/08/2024 30     Anion Gap 10/08/2024 13     Urea Nitrogen 10/08/2024 22     Creatinine 10/08/2024 1.03     eGFR 10/08/2024 70     Calcium 10/08/2024 9.3     Albumin 10/08/2024 4.3     Alkaline Phosphatase 10/08/2024 57     Total Protein 10/08/2024 6.2 (L)     AST 10/08/2024 12     Bilirubin, Total 10/08/2024 1.0     ALT 10/08/2024 12     WBC 10/08/2024 8.0     nRBC 10/08/2024 0.0     RBC 10/08/2024 4.95     Hemoglobin 10/08/2024 16.1     Hematocrit 10/08/2024 45.6     MCV 10/08/2024 92     MCH 10/08/2024 32.5     MCHC 10/08/2024 35.3     RDW 10/08/2024 14.0     Platelets 10/08/2024 202     Neutrophils % 10/08/2024 79.9     Immature Granulocytes %,* 10/08/2024 0.4     Lymphocytes % 10/08/2024 10.9     Monocytes % 10/08/2024 6.3     Eosinophils % 10/08/2024 2.0     Basophils % 10/08/2024 0.5     Neutrophils Absolute 10/08/2024 6.36 (H)     Immature Granulocytes Ab* 10/08/2024 0.03     Lymphocytes Absolute 10/08/2024 0.87     Monocytes Absolute 10/08/2024 0.50     Eosinophils Absolute 10/08/2024 0.16     Basophils Absolute 10/08/2024 0.04     Hemoglobin A1C 10/08/2024 5.2     Estimated Average Glucose 10/08/2024 103    Lab on 08/06/2024   Component Date Value    WBC 08/06/2024 12.1 (H)     nRBC 08/06/2024 0.0     RBC 08/06/2024 5.45     Hemoglobin 08/06/2024 17.1     Hematocrit 08/06/2024 49.6     MCV 08/06/2024 91     MCH 08/06/2024 31.4     MCHC 08/06/2024 34.5     RDW 08/06/2024 13.3     Platelets 08/06/2024 287     Neutrophils % 08/06/2024 82.8     Immature Granulocytes %,* 08/06/2024 0.3     Lymphocytes % 08/06/2024 8.8     Monocytes %  08/06/2024 5.5     Eosinophils % 08/06/2024 2.3     Basophils % 08/06/2024 0.3     Neutrophils Absolute 08/06/2024 9.98 (H)     Immature Granulocytes Ab* 08/06/2024 0.04     Lymphocytes Absolute 08/06/2024 1.06     Monocytes Absolute 08/06/2024 0.66     Eosinophils Absolute 08/06/2024 0.28     Basophils Absolute 08/06/2024 0.04    Lab on 06/17/2024   Component Date Value    WBC 06/17/2024 8.0     nRBC 06/17/2024 0.0     RBC 06/17/2024 5.54     Hemoglobin 06/17/2024 17.1     Hematocrit 06/17/2024 50.8     MCV 06/17/2024 92     MCH 06/17/2024 30.9     MCHC 06/17/2024 33.7     RDW 06/17/2024 14.0     Platelets 06/17/2024 223     Neutrophils % 06/17/2024 73.8     Immature Granulocytes %,* 06/17/2024 0.2     Lymphocytes % 06/17/2024 15.2     Monocytes % 06/17/2024 8.0     Eosinophils % 06/17/2024 2.2     Basophils % 06/17/2024 0.6     Neutrophils Absolute 06/17/2024 5.91 (H)     Immature Granulocytes Ab* 06/17/2024 0.02     Lymphocytes Absolute 06/17/2024 1.22     Monocytes Absolute 06/17/2024 0.64     Eosinophils Absolute 06/17/2024 0.18     Basophils Absolute 06/17/2024 0.05     Glucose 06/17/2024 156 (H)     Sodium 06/17/2024 139     Potassium 06/17/2024 4.0     Chloride 06/17/2024 99     Bicarbonate 06/17/2024 27     Urea Nitrogen 06/17/2024 14     Creatinine 06/17/2024 1.10     eGFR 06/17/2024 65     Calcium 06/17/2024 9.3     Albumin 06/17/2024 4.4     Alkaline Phosphatase 06/17/2024 74     Total Protein 06/17/2024 6.5     AST 06/17/2024 13     Bilirubin, Total 06/17/2024 1.0     ALT 06/17/2024 15     Anion Gap 06/17/2024 13     Cholesterol 06/17/2024 210 (H)     HDL-Cholesterol 06/17/2024 35.0 (L)     Cholesterol/HDL Ratio 06/17/2024 6.0     LDL Calculated 06/17/2024 124     Triglycerides 06/17/2024 254 (H)     Uric Acid 06/17/2024 7.7     Hemoglobin A1C 06/17/2024 5.6     Estimated Average Glucose 06/17/2024 114         Assessment/Plan   1.  Hypertension.  The patient has longstanding hypertension currently is  upper 80s and age.  Patient currently on relatively high dose lisinopril.  20 mg twice daily and amlodipine 5 mg twice daily.  The patient is also on hydrochlorothiazide 25 mg daily.  Blood pressure acceptable and will modify therapy by reducing the lisinopril from 20 mg twice daily to daily.  Potentially may reduce the amlodipine in the future.  He will be started on metoprolol ER 50 mg daily.  Would discontinue hydrochlorothiazide and utilize Lasix 20 mg daily because of his complaint of edema.  The patient did have an echocardiogram 10/24/2024 showing a preserved LV ejection fraction of 65% with 3+ mitral valve regurgitation.  Given the mitral valve regurgitation the patient potentially might be in need of the low-dose Lasix as well.  Clinically he had been walking 2 miles per day up to 2 months ago but now tires more easily with significantly reduced activity.  2.  Nondiabetic.  Lab work from 10/8/2024 included normal CBC and CMP with glycohemoglobin 5.2%.  3.  Hyperlipidemia.  Lipid panel 6/17/2024 includes cholesterol 210  HDL 35 triglyceride 254.  Given the presence of coronary artery calcification will begin rosuvastatin 20 mg daily.  I4.  Former smoking.  Patient had been a former smoker 1/2/day quit 20 years ago.  5.  Coronary artery disease.  This patient had a CT angiogram on 6/17/2022 negative for pulmonary embolism positive for mild emphysema and coronary artery calcification.  Given these findings he was started on aspirin 81 mg daily plus the rosuvastatin 20 mg daily.  6.  Status post bilateral total hip replacement  7.  Status post left total knee replacement.  8.  Status post appendectomy.  9.  Status post repair of undescended testicle.          Orders:  No orders of the defined types were placed in this encounter.     Followup Appts:  Future Appointments   Date Time Provider Department Center   12/24/2024 11:00 AM Beulah Huynh PA-C [id number] [facility]   1/3/2025 11:45 AM Mohan Lock  MD [id number] [facility]   1/20/2025  4:45 PM Mohan Lock MD [id number] [facility]   2/27/2025  2:15 PM Dallas Zarate MD [id number] [facility]   4/11/2025 10:45 AM Mohan Lock MD [id number] [facility]   5/30/2025 11:00 AM Dallas Zarate MD [id number] [facility]   10/24/2025 11:00 AM Mohan Lock MD [id number] [facility]           ____________________________________________________________  Dallas Zarate MD  [facility]  Assistant Clinical Professor, [employer]  [facility]

## 2025-01-03 ENCOUNTER — APPOINTMENT (OUTPATIENT)
Dept: PRIMARY CARE | Facility: CLINIC | Age: 89
End: 2025-01-03
Payer: MEDICARE

## 2025-01-11 ENCOUNTER — CLINICAL SUPPORT (OUTPATIENT)
Dept: URGENT CARE | Age: 89
End: 2025-01-11
Payer: MEDICARE

## 2025-01-11 VITALS
DIASTOLIC BLOOD PRESSURE: 79 MMHG | OXYGEN SATURATION: 93 % | TEMPERATURE: 96.8 F | HEART RATE: 82 BPM | SYSTOLIC BLOOD PRESSURE: 161 MMHG | RESPIRATION RATE: 20 BRPM

## 2025-01-11 DIAGNOSIS — H92.01 EAR PAIN, RIGHT: Primary | ICD-10-CM

## 2025-01-11 DIAGNOSIS — H60.391 OTHER INFECTIVE ACUTE OTITIS EXTERNA OF RIGHT EAR: ICD-10-CM

## 2025-01-11 RX ORDER — NEOMYCIN SULFATE, POLYMYXIN B SULFATE, HYDROCORTISONE 3.5; 10000; 1 MG/ML; [USP'U]/ML; MG/ML
3 SOLUTION/ DROPS AURICULAR (OTIC) 4 TIMES DAILY
Qty: 10 ML | Refills: 0 | Status: SHIPPED | OUTPATIENT
Start: 2025-01-11 | End: 2025-01-21

## 2025-01-11 ASSESSMENT — PAIN SCALES - GENERAL: PAINLEVEL_OUTOF10: 5

## 2025-01-12 NOTE — PROGRESS NOTES
Subjective   Patient ID: David Motta is a 89 y.o. male. They present today with a chief complaint of scratch inside right ear (Thinks scratched inside of ear with hearing aid. Having some pain in ear and jaw. Started yesterday.).    History of Present Illness  Ear pain for a few days and today the pain has increased - right ear  Daughter is int he room with her          Past Medical History  Allergies as of 01/11/2025 - Reviewed 01/11/2025   Allergen Reaction Noted    Sulfa (sulfonamide antibiotics) Other 09/14/2023    Levofloxacin Hives 09/14/2023    Nitrofurantoin monohyd/m-cryst Itching 09/14/2023    Atorvastatin Other 09/14/2023    Dexamethasone Dizziness 03/14/2024    Gabapentin Other 10/11/2024    Pravastatin sodium Myalgia 09/14/2023    Allopurinol Rash 03/15/2024       (Not in a hospital admission)       Past Medical History:   Diagnosis Date    Anxiety 09/14/2023    Autoimmune hemolytic anemia (Multi) 09/14/2023    Back pain of lumbar region with sciatica 09/14/2023    Benign prostatic hyperplasia 09/14/2023    Depression 09/14/2023    Gastroesophageal reflux disease without esophagitis 09/14/2023    Hematuria syndrome 09/14/2023    Hyperlipidemia 09/14/2023    Hypertension 09/14/2023    Lumbar spondylosis 09/14/2023    Neuropathy 09/14/2023    Osteoarthritis 09/14/2023    Overactive bladder 09/14/2023    Type 2 diabetes mellitus without complication, without long-term current use of insulin (Multi) 09/14/2023       Past Surgical History:   Procedure Laterality Date    APPENDECTOMY  2005    COLONOSCOPY  2007    also 2010    COLONOSCOPY  2018    EYE SURGERY  2019    KNEE SURGERY Left 2018    TOTAL HIP ARTHROPLASTY Left 2006    TOTAL HIP ARTHROPLASTY Right 2010    TRIGGER FINGER RELEASE Right 2015        reports that he has quit smoking. His smoking use included cigarettes. He has been exposed to tobacco smoke. He has never used smokeless tobacco. He reports current alcohol use. He reports that he does  not use drugs.    Review of Systems  Review of Systems   HENT:  Positive for ear pain.    All other systems reviewed and are negative.                                 Objective    Vitals:    01/11/25 1947   BP: 161/79   Pulse: 82   Resp: 20   Temp: 36 °C (96.8 °F)   TempSrc: Oral   SpO2: 93%     No LMP for male patient.    Physical Exam  Vitals reviewed.   Constitutional:       Appearance: Normal appearance.   HENT:      Right Ear: Hearing normal. Swelling and tenderness present. No drainage.      Left Ear: Hearing, tympanic membrane, ear canal and external ear normal.      Mouth/Throat:      Lips: Pink.      Mouth: Mucous membranes are moist.      Pharynx: Oropharynx is clear. Uvula midline.   Cardiovascular:      Rate and Rhythm: Normal rate and regular rhythm. Extrasystoles are present.     Pulses: Normal pulses.   Pulmonary:      Effort: Pulmonary effort is normal.      Breath sounds: Normal breath sounds and air entry.   Neurological:      Mental Status: He is alert.         Procedures    Point of Care Test & Imaging Results from this visit  No results found for this visit on 01/11/25.   No results found.    Diagnostic study results (if any) were reviewed by [facility].    Assessment/Plan   Allergies, medications, history, and pertinent labs/EKGs/Imaging reviewed by STEPHANIA Suárez-CNP.     Medical Decision Making  Start the cortisporin - call if there is any change in condition    Orders and Diagnoses  Encounter Diagnoses   Name Primary?    Ear pain, right Yes    Other infective acute otitis externa of right ear            Medical Admin Record      Patient disposition: Home    Electronically signed by [facility]  7:50 PM

## 2025-01-20 ENCOUNTER — OFFICE VISIT (OUTPATIENT)
Dept: PRIMARY CARE | Facility: CLINIC | Age: 89
End: 2025-01-20
Payer: MEDICARE

## 2025-01-20 VITALS
HEART RATE: 81 BPM | DIASTOLIC BLOOD PRESSURE: 80 MMHG | HEIGHT: 69 IN | OXYGEN SATURATION: 94 % | WEIGHT: 198 LBS | BODY MASS INDEX: 29.33 KG/M2 | SYSTOLIC BLOOD PRESSURE: 140 MMHG

## 2025-01-20 DIAGNOSIS — M10.9 GOUT, UNSPECIFIED CAUSE, UNSPECIFIED CHRONICITY, UNSPECIFIED SITE: ICD-10-CM

## 2025-01-20 DIAGNOSIS — J40 BRONCHITIS: ICD-10-CM

## 2025-01-20 DIAGNOSIS — E78.2 MIXED HYPERLIPIDEMIA: ICD-10-CM

## 2025-01-20 DIAGNOSIS — M47.816 LUMBAR SPONDYLOSIS: ICD-10-CM

## 2025-01-20 DIAGNOSIS — I10 HYPERTENSION, UNSPECIFIED TYPE: ICD-10-CM

## 2025-01-20 DIAGNOSIS — R73.9 HYPERGLYCEMIA: ICD-10-CM

## 2025-01-20 DIAGNOSIS — M1A.9XX0 CHRONIC GOUT WITHOUT TOPHUS, UNSPECIFIED CAUSE, UNSPECIFIED SITE: ICD-10-CM

## 2025-01-20 DIAGNOSIS — E55.9 VITAMIN D DEFICIENCY: ICD-10-CM

## 2025-01-20 DIAGNOSIS — I10 PRIMARY HYPERTENSION: ICD-10-CM

## 2025-01-20 DIAGNOSIS — E11.9 TYPE 2 DIABETES MELLITUS WITHOUT COMPLICATION, WITHOUT LONG-TERM CURRENT USE OF INSULIN (MULTI): Primary | ICD-10-CM

## 2025-01-20 DIAGNOSIS — K21.9 GASTROESOPHAGEAL REFLUX DISEASE WITHOUT ESOPHAGITIS: ICD-10-CM

## 2025-01-20 DIAGNOSIS — N32.81 OVERACTIVE BLADDER: ICD-10-CM

## 2025-01-20 DIAGNOSIS — E79.0 HYPERURICEMIA: ICD-10-CM

## 2025-01-20 PROBLEM — D59.10 AUTOIMMUNE HEMOLYTIC ANEMIA (MULTI): Status: RESOLVED | Noted: 2023-09-14 | Resolved: 2025-01-20

## 2025-01-20 PROCEDURE — 1159F MED LIST DOCD IN RCRD: CPT | Performed by: INTERNAL MEDICINE

## 2025-01-20 PROCEDURE — G2211 COMPLEX E/M VISIT ADD ON: HCPCS | Performed by: INTERNAL MEDICINE

## 2025-01-20 PROCEDURE — 99214 OFFICE O/P EST MOD 30 MIN: CPT | Performed by: INTERNAL MEDICINE

## 2025-01-20 PROCEDURE — 1126F AMNT PAIN NOTED NONE PRSNT: CPT | Performed by: INTERNAL MEDICINE

## 2025-01-20 PROCEDURE — 3079F DIAST BP 80-89 MM HG: CPT | Performed by: INTERNAL MEDICINE

## 2025-01-20 PROCEDURE — 1160F RVW MEDS BY RX/DR IN RCRD: CPT | Performed by: INTERNAL MEDICINE

## 2025-01-20 PROCEDURE — 3077F SYST BP >= 140 MM HG: CPT | Performed by: INTERNAL MEDICINE

## 2025-01-20 RX ORDER — BENZONATATE 100 MG/1
100 CAPSULE ORAL 3 TIMES DAILY PRN
Qty: 42 CAPSULE | Refills: 0 | Status: SHIPPED | OUTPATIENT
Start: 2025-01-20 | End: 2025-02-19

## 2025-01-20 RX ORDER — AMLODIPINE BESYLATE 5 MG/1
5 TABLET ORAL DAILY
Status: SHIPPED
Start: 2025-01-20

## 2025-01-20 RX ORDER — AMOXICILLIN 500 MG/1
500 CAPSULE ORAL EVERY 8 HOURS SCHEDULED
Qty: 21 CAPSULE | Refills: 0 | Status: SHIPPED | OUTPATIENT
Start: 2025-01-20 | End: 2025-01-27

## 2025-01-20 RX ORDER — METOPROLOL SUCCINATE 25 MG/1
25 TABLET, EXTENDED RELEASE ORAL DAILY
Qty: 90 TABLET | Refills: 3 | Status: SHIPPED | OUTPATIENT
Start: 2025-01-20 | End: 2026-01-20

## 2025-01-20 ASSESSMENT — PAIN SCALES - GENERAL: PAINLEVEL_OUTOF10: 0-NO PAIN

## 2025-01-20 ASSESSMENT — ENCOUNTER SYMPTOMS
FATIGUE: 1
GASTROINTESTINAL NEGATIVE: 1
NEUROLOGICAL NEGATIVE: 1
APPETITE CHANGE: 0
CHEST TIGHTNESS: 0
ARTHRALGIAS: 1
SHORTNESS OF BREATH: 1
COUGH: 1

## 2025-01-20 NOTE — PROGRESS NOTES
[facility]: [facility]  PROGRESS NOTE      David Motta is a 89 y.o. male that is presenting today for Follow-up.    Assessment/Plan   Diagnoses and all orders for this visit:  Type 2 diabetes mellitus without complication, without long-term current use of insulin (Multi)  Lumbar spondylosis  Primary hypertension  -     CBC and Auto Differential; Future  -     Comprehensive Metabolic Panel; Future  -     Magnesium; Future  -     metoprolol succinate XL (Toprol-XL) 25 mg 24 hr tablet; Take 1 tablet (25 mg) by mouth once daily. Do not crush or chew.  Chronic gout without tophus, unspecified cause, unspecified site  Mixed hyperlipidemia  -     Lipid Panel; Future  -     TSH with reflex to Free T4 if abnormal; Future  Gastroesophageal reflux disease without esophagitis  Overactive bladder  Gout, unspecified cause, unspecified chronicity, unspecified site  Hyperglycemia  -     Hemoglobin A1C; Future  Hyperuricemia  -     Uric Acid; Future  Hypertension, unspecified type  -     amLODIPine (Norvasc) 5 mg tablet; Take 1 tablet (5 mg) by mouth once daily.  Vitamin D deficiency  -     Vitamin D 25-Hydroxy,Total (for eval of Vitamin D levels); Future  Bronchitis  -     amoxicillin (Amoxil) 500 mg capsule; Take 1 capsule (500 mg) by mouth every 8 hours for 7 days.  -     benzonatate (Tessalon) 100 mg capsule; Take 1 capsule (100 mg) by mouth 3 times a day as needed for cough. Do not crush or chew.  Other orders  -     Follow Up In Primary Care - Established  -     Follow Up In Primary Care - Established; Future  -     Follow Up In Primary Care - Medicare Annual; Future  We reviewed the whole situation including reviewing his chart.  He is due for blood work and I have ordered this for him.  Here are a few thoughts however:  1.  Fatigue-he did not tolerate statin in the past and he is taking metoprolol 50.  We are going to stop his statin and reduce his metoprolol back to 25 mg/day.  The statin does  not play a large role anyway and primary prevention at his age.  2.  Bronchitis-we will treat this with amoxicillin and Tessalon Perles.    We will check his labs and I will get back to him afterwards  Subjective   Tom sees me today for his 6-month follow-up appointment.  After I last had seen him there have been some changes to note.  He got placed on a statin by cardiology and some adjustments in his medicines including switching the hydrochlorothiazide to furosemide and reducing his amlodipine from 10 to 5 mg/day.  On the good side his leg swelling is better.  On the bad side he just does not feel good he feels tired his exercise tolerance has gone down and he felt better before.    In addition to this he had an infection he had an upper respiratory tract infection that was diagnosed with an otitis for which she was given Cortisporin drops he continues to have cough somewhat productive at times and does not feel like his baseline again yet.      Review of Systems   Constitutional:  Positive for fatigue. Negative for appetite change.   Respiratory:  Positive for cough and shortness of breath. Negative for chest tightness.    Gastrointestinal: Negative.    Genitourinary: Negative.    Musculoskeletal:  Positive for arthralgias.   Neurological: Negative.       Objective   Vitals:    01/20/25 1549   BP: 140/80   Pulse: 81   SpO2: 94%      Body mass index is 29.23 kg/m².  Physical Exam  HENT:      Right Ear: Tympanic membrane and ear canal normal.   Cardiovascular:      Pulses: Normal pulses.      Heart sounds: Murmur heard.   Pulmonary:      Effort: Pulmonary effort is normal.      Breath sounds: Normal breath sounds.   Abdominal:      General: Abdomen is flat. Bowel sounds are normal.      Palpations: Abdomen is soft.   Musculoskeletal:         General: Normal range of motion.   Skin:     General: Skin is warm and dry.   Neurological:      General: No focal deficit present.   Psychiatric:         Mood and Affect:  "Mood normal.         Behavior: Behavior normal.       Diagnostic Results   Lab Results   Component Value Date    GLUCOSE 221 (H) 10/08/2024    CALCIUM 9.3 10/08/2024     10/08/2024    K 3.9 10/08/2024    CO2 30 10/08/2024     10/08/2024    BUN 22 10/08/2024    CREATININE 1.03 10/08/2024     Lab Results   Component Value Date    ALT 12 10/08/2024    AST 12 10/08/2024    ALKPHOS 57 10/08/2024    BILITOT 1.0 10/08/2024     Lab Results   Component Value Date    WBC 8.0 10/08/2024    HGB 16.1 10/08/2024    HCT 45.6 10/08/2024    MCV 92 10/08/2024     10/08/2024     Lab Results   Component Value Date    CHOL 210 (H) 06/17/2024    CHOL 191 04/15/2022    CHOL 205 (H) 03/29/2021     Lab Results   Component Value Date    HDL 35.0 (L) 06/17/2024    HDL 40 (L) 04/15/2022    HDL 42 03/29/2021     Lab Results   Component Value Date    LDLCALC 124 06/17/2024    LDLCALC 130 04/15/2022    LDLCALC 137 (H) 03/29/2021     Lab Results   Component Value Date    TRIG 254 (H) 06/17/2024    TRIG 104 04/15/2022    TRIG 132 03/29/2021     No components found for: \"CHOLHDL\"  Lab Results   Component Value Date    HGBA1C 5.2 10/08/2024     Other labs not included in the list above were reviewed either before or during this encounter.    History    Past Medical History:   Diagnosis Date    Anxiety 09/14/2023    Autoimmune hemolytic anemia (Multi) 09/14/2023    Back pain of lumbar region with sciatica 09/14/2023    Benign prostatic hyperplasia 09/14/2023    Depression 09/14/2023    Gastroesophageal reflux disease without esophagitis 09/14/2023    Hematuria syndrome 09/14/2023    Hyperlipidemia 09/14/2023    Hypertension 09/14/2023    Lumbar spondylosis 09/14/2023    Neuropathy 09/14/2023    Osteoarthritis 09/14/2023    Overactive bladder 09/14/2023    Type 2 diabetes mellitus without complication, without long-term current use of insulin (Multi) 09/14/2023     Past Surgical History:   Procedure Laterality Date    APPENDECTOMY  " 2005    COLONOSCOPY  2007    also 2010    COLONOSCOPY  2018    EYE SURGERY  2019    KNEE SURGERY Left 2018    TOTAL HIP ARTHROPLASTY Left 2006    TOTAL HIP ARTHROPLASTY Right 2010    TRIGGER FINGER RELEASE Right 2015     Family History   Problem Relation Name Age of Onset    No Known Problems Mother      No Known Problems Father      Other (High Blood Pressure) Other Family History     Cancer Other Family History      Social History     Socioeconomic History    Marital status:      Spouse name: Not on file    Number of children: Not on file    Years of education: Not on file    Highest education level: Not on file   Occupational History    Not on file   Tobacco Use    Smoking status: Former     Types: Cigarettes     Passive exposure: Past    Smokeless tobacco: Never   Vaping Use    Vaping status: Never Used   Substance and Sexual Activity    Alcohol use: Yes     Comment: occasional    Drug use: Never    Sexual activity: Not on file   Other Topics Concern    Not on file   Social History Narrative    Not on file     Social Drivers of Health     Financial Resource Strain: Not on file   Food Insecurity: Not on file   Transportation Needs: Not on file   Physical Activity: Not on file   Stress: Not on file   Social Connections: Not on file   Intimate Partner Violence: Not on file   Housing Stability: Not on file     Allergies   Allergen Reactions    Sulfa (Sulfonamide Antibiotics) Other    Levofloxacin Hives    Nitrofurantoin Monohyd/M-Cryst Itching    Atorvastatin Other    Dexamethasone Dizziness    Gabapentin Other     Daughter states patient is allergic    Pravastatin Sodium Myalgia    Allopurinol Rash     Current Outpatient Medications on File Prior to Visit   Medication Sig Dispense Refill    ascorbic acid (Vitamin C) 500 mg tablet Take by mouth.      aspirin 81 mg EC tablet Take 1 tablet (81 mg) by mouth once daily. 90 tablet 3    cholecalciferol, vitamin D3, (VITAMIN D3 ORAL) Take 1 tablet by mouth once  daily.      finasteride (Proscar) 5 mg tablet Take 1 tablet (5 mg) by mouth once daily. Do not crush, chew, or split.      fluocinonide 0.05 % cream APPLY TOPICALLY TO THE AFFECTED AREA TWICE DAILY AS NEEDED FOR FLARES      furosemide (Lasix) 20 mg tablet Take 1 tablet (20 mg) by mouth once daily. 90 tablet 3    lisinopril 20 mg tablet Take 1 tablet (20 mg) by mouth once daily. 180 tablet 3    magnesium oxide-Mg AA chelate (Magnesium, oxide/AA chelate,) 300 mg capsule Take 1 capsule (300 mg) by mouth once daily.      neomycin-polymyxin-HC (Cortisporin) otic solution Administer 3 drops into the right ear 4 times a day for 10 days. 10 mL 0    NON FORMULARY Lithsone ( Brain Vitamin)      tamsulosin (Flomax) 0.4 mg 24 hr capsule Take 1 capsule (0.4 mg) by mouth. AT BEDTIME      traZODone (Desyrel) 50 mg tablet TAKE 1 TABLET BY MOUTH ONCE  DAILY AT BEDTIME 90 tablet 3    zinc gluconate 50 mg tablet Take 1 tablet (50 mg) by mouth once daily.      [DISCONTINUED] amLODIPine (Norvasc) 5 mg tablet TAKE 1 TABLET BY MOUTH TWICE  DAILY 180 tablet 3    [DISCONTINUED] metoprolol succinate XL (Toprol-XL) 50 mg 24 hr tablet Take 1 tablet (50 mg) by mouth once daily. Do not crush or chew. 90 tablet 3    [DISCONTINUED] rosuvastatin (Crestor) 20 mg tablet Take 1 tablet (20 mg) by mouth once daily. 90 tablet 3     No current facility-administered medications on file prior to visit.     Immunization History   Administered Date(s) Administered    Flu vaccine, quadrivalent, high-dose, preservative free, age 65y+ (FLUZONE) 12/03/2021, 10/19/2022    Flu vaccine, trivalent, preservative free, HIGH-DOSE, age 65y+ (Fluzone) 10/11/2024    Influenza, Unspecified 10/19/2022    Moderna SARS-CoV-2 Vaccination 04/16/2021, 05/13/2021    Pneumococcal conjugate vaccine, 13-valent (PREVNAR 13) 06/06/2016    Pneumococcal conjugate vaccine, 20-valent (PREVNAR 20) 10/11/2024    Td vaccine, age 7 years and older (TDVAX) 07/03/2003, 04/23/2013    Tdap  vaccine, age 7 year and older (BOOSTRIX, ADACEL) 02/03/2016    Zoster, live 05/06/2014     Patient's medical history was reviewed and updated either before or during this encounter.       Mohan Lock MD

## 2025-01-20 NOTE — PATIENT INSTRUCTIONS
Santos were going to make several changes in your medicines today as follows:  1.  Reduce the metoprolol from 50 mg to 25 mg/day you can cut the 50 mg tabs in half and take a half a tablet a day until the new order comes in.  2.  You may stop the rosuvastatin/Crestor  3.  Start amoxicillin 500 mg 3 times a day for the next 7 days  4.  Take Tessalon Perles 100 mg 3 times a day as needed for cough over the next several days.    Please do your blood work that I ordered and I will get back to you about that otherwise I will just plan on seeing you back in 3 months unless you need me sooner

## 2025-01-21 ENCOUNTER — TELEPHONE (OUTPATIENT)
Dept: PRIMARY CARE | Facility: CLINIC | Age: 89
End: 2025-01-21
Payer: MEDICARE

## 2025-01-21 NOTE — TELEPHONE ENCOUNTER
Patient's daughter states that pt started benzonatate and amoxicillin yesterday. Daughter states that he woke up this morning with a red, non itchy, bumpy rash on his legs. Daughter would like to know if this could be a reaction to the Perles since pt has never taken them?

## 2025-01-24 ENCOUNTER — LAB (OUTPATIENT)
Dept: LAB | Facility: LAB | Age: 89
End: 2025-01-24
Payer: MEDICARE

## 2025-01-24 DIAGNOSIS — R73.9 HYPERGLYCEMIA: ICD-10-CM

## 2025-01-24 DIAGNOSIS — E79.0 HYPERURICEMIA: ICD-10-CM

## 2025-01-24 DIAGNOSIS — E78.2 MIXED HYPERLIPIDEMIA: ICD-10-CM

## 2025-01-24 DIAGNOSIS — E55.9 VITAMIN D DEFICIENCY: ICD-10-CM

## 2025-01-24 DIAGNOSIS — I10 PRIMARY HYPERTENSION: ICD-10-CM

## 2025-01-24 LAB
25(OH)D3 SERPL-MCNC: 72 NG/ML (ref 30–100)
ALBUMIN SERPL BCP-MCNC: 4.2 G/DL (ref 3.4–5)
ALP SERPL-CCNC: 57 U/L (ref 33–136)
ALT SERPL W P-5'-P-CCNC: 26 U/L (ref 10–52)
ANION GAP SERPL CALCULATED.3IONS-SCNC: 13 MMOL/L (ref 10–20)
AST SERPL W P-5'-P-CCNC: 18 U/L (ref 9–39)
BASOPHILS # BLD AUTO: 0.05 X10*3/UL (ref 0–0.1)
BASOPHILS NFR BLD AUTO: 0.5 %
BILIRUB SERPL-MCNC: 0.8 MG/DL (ref 0–1.2)
BUN SERPL-MCNC: 17 MG/DL (ref 6–23)
CALCIUM SERPL-MCNC: 8.9 MG/DL (ref 8.6–10.3)
CHLORIDE SERPL-SCNC: 103 MMOL/L (ref 98–107)
CHOLEST SERPL-MCNC: 165 MG/DL (ref 0–199)
CHOLEST/HDLC SERPL: 5.4 {RATIO}
CO2 SERPL-SCNC: 30 MMOL/L (ref 21–32)
CREAT SERPL-MCNC: 1.09 MG/DL (ref 0.5–1.3)
EGFRCR SERPLBLD CKD-EPI 2021: 66 ML/MIN/1.73M*2
EOSINOPHIL # BLD AUTO: 0.45 X10*3/UL (ref 0–0.4)
EOSINOPHIL NFR BLD AUTO: 4.9 %
ERYTHROCYTE [DISTWIDTH] IN BLOOD BY AUTOMATED COUNT: 13.1 % (ref 11.5–14.5)
EST. AVERAGE GLUCOSE BLD GHB EST-MCNC: 126 MG/DL
GLUCOSE SERPL-MCNC: 168 MG/DL (ref 74–99)
HBA1C MFR BLD: 6 %
HCT VFR BLD AUTO: 47.1 % (ref 41–52)
HDLC SERPL-MCNC: 30.7 MG/DL
HGB BLD-MCNC: 16.1 G/DL (ref 13.5–17.5)
IMM GRANULOCYTES # BLD AUTO: 0.03 X10*3/UL (ref 0–0.5)
IMM GRANULOCYTES NFR BLD AUTO: 0.3 % (ref 0–0.9)
LDLC SERPL CALC-MCNC: 98 MG/DL
LYMPHOCYTES # BLD AUTO: 1.22 X10*3/UL (ref 0.8–3)
LYMPHOCYTES NFR BLD AUTO: 13.3 %
MAGNESIUM SERPL-MCNC: 2.32 MG/DL (ref 1.6–2.4)
MCH RBC QN AUTO: 32.1 PG (ref 26–34)
MCHC RBC AUTO-ENTMCNC: 34.2 G/DL (ref 32–36)
MCV RBC AUTO: 94 FL (ref 80–100)
MONOCYTES # BLD AUTO: 0.66 X10*3/UL (ref 0.05–0.8)
MONOCYTES NFR BLD AUTO: 7.2 %
NEUTROPHILS # BLD AUTO: 6.77 X10*3/UL (ref 1.6–5.5)
NEUTROPHILS NFR BLD AUTO: 73.8 %
NON HDL CHOLESTEROL: 134 MG/DL (ref 0–149)
NRBC BLD-RTO: 0 /100 WBCS (ref 0–0)
PLATELET # BLD AUTO: 220 X10*3/UL (ref 150–450)
POTASSIUM SERPL-SCNC: 3.7 MMOL/L (ref 3.5–5.3)
PROT SERPL-MCNC: 6 G/DL (ref 6.4–8.2)
RBC # BLD AUTO: 5.02 X10*6/UL (ref 4.5–5.9)
SODIUM SERPL-SCNC: 142 MMOL/L (ref 136–145)
TRIGL SERPL-MCNC: 184 MG/DL (ref 0–149)
TSH SERPL-ACNC: 2.83 MIU/L (ref 0.44–3.98)
URATE SERPL-MCNC: 7.1 MG/DL (ref 4–7.5)
VLDL: 37 MG/DL (ref 0–40)
WBC # BLD AUTO: 9.2 X10*3/UL (ref 4.4–11.3)

## 2025-01-24 PROCEDURE — 83735 ASSAY OF MAGNESIUM: CPT

## 2025-01-24 PROCEDURE — 80053 COMPREHEN METABOLIC PANEL: CPT

## 2025-01-24 PROCEDURE — 84550 ASSAY OF BLOOD/URIC ACID: CPT

## 2025-01-24 PROCEDURE — 84443 ASSAY THYROID STIM HORMONE: CPT

## 2025-01-24 PROCEDURE — 83036 HEMOGLOBIN GLYCOSYLATED A1C: CPT

## 2025-01-24 PROCEDURE — 85025 COMPLETE CBC W/AUTO DIFF WBC: CPT

## 2025-01-24 PROCEDURE — 80061 LIPID PANEL: CPT

## 2025-01-24 PROCEDURE — 82306 VITAMIN D 25 HYDROXY: CPT

## 2025-02-19 ENCOUNTER — APPOINTMENT (OUTPATIENT)
Dept: CARDIOLOGY | Facility: CLINIC | Age: 89
End: 2025-02-19
Payer: MEDICARE

## 2025-02-19 ENCOUNTER — OFFICE VISIT (OUTPATIENT)
Dept: CARDIOLOGY | Facility: CLINIC | Age: 89
End: 2025-02-19
Payer: MEDICARE

## 2025-02-19 VITALS
OXYGEN SATURATION: 93 % | HEIGHT: 69 IN | SYSTOLIC BLOOD PRESSURE: 150 MMHG | BODY MASS INDEX: 29.18 KG/M2 | WEIGHT: 197 LBS | HEART RATE: 76 BPM | DIASTOLIC BLOOD PRESSURE: 82 MMHG

## 2025-02-19 DIAGNOSIS — E78.2 MIXED HYPERLIPIDEMIA: ICD-10-CM

## 2025-02-19 PROCEDURE — 3079F DIAST BP 80-89 MM HG: CPT | Performed by: INTERNAL MEDICINE

## 2025-02-19 PROCEDURE — 99214 OFFICE O/P EST MOD 30 MIN: CPT | Performed by: INTERNAL MEDICINE

## 2025-02-19 PROCEDURE — 3077F SYST BP >= 140 MM HG: CPT | Performed by: INTERNAL MEDICINE

## 2025-02-19 PROCEDURE — 1159F MED LIST DOCD IN RCRD: CPT | Performed by: INTERNAL MEDICINE

## 2025-02-19 PROCEDURE — 1160F RVW MEDS BY RX/DR IN RCRD: CPT | Performed by: INTERNAL MEDICINE

## 2025-02-19 RX ORDER — ROSUVASTATIN CALCIUM 20 MG/1
20 TABLET, COATED ORAL DAILY
Qty: 90 TABLET | Refills: 3 | Status: SHIPPED | OUTPATIENT
Start: 2025-02-19 | End: 2026-02-19

## 2025-02-19 ASSESSMENT — ENCOUNTER SYMPTOMS: DEPRESSION: 0

## 2025-02-19 ASSESSMENT — PATIENT HEALTH QUESTIONNAIRE - PHQ9
1. LITTLE INTEREST OR PLEASURE IN DOING THINGS: NOT AT ALL
SUM OF ALL RESPONSES TO PHQ9 QUESTIONS 1 AND 2: 1
2. FEELING DOWN, DEPRESSED OR HOPELESS: SEVERAL DAYS

## 2025-02-19 NOTE — PROGRESS NOTES
Primary Care Physician: Mohan Lock MD  Date of Visit: 02/19/2025  3:15 PM EST  Location of visit: [address]     Chief Complaint:   No chief complaint on file.    HPI / Summary:   David Motta is a 89 y.o. 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  Surgical Hx:   He has a past surgical history that includes Total hip arthroplasty (Left, 2006); Colonoscopy (2007); Appendectomy (2005); Total hip arthroplasty (Right, 2010); Trigger finger release (Right, 2015); Colonoscopy (2018); Knee surgery (Left, 2018); and Eye surgery (2019).   Social Hx:   He reports that he has quit smoking. His smoking use included cigarettes. He has been exposed to tobacco smoke. He has never used smokeless tobacco. He reports current alcohol use. He reports that he does not use drugs.  Family Hx:   His family history includes Cancer in an other family member; High Blood Pressure in an other family member; No Known Problems in his father and mother.   Allergies:  Allergies   Allergen Reactions    Sulfa (Sulfonamide Antibiotics) Other    Levofloxacin Hives    Nitrofurantoin Monohyd/M-Cryst Itching    Atorvastatin Other    Dexamethasone Dizziness    Gabapentin Other     Daughter states patient is allergic    Pravastatin Sodium Myalgia    Allopurinol Rash    Amoxicillin Rash     Outpatient  "Medications:  Current Outpatient Medications   Medication Instructions    amLODIPine (NORVASC) 5 mg, oral, Daily    ascorbic acid (Vitamin C) 500 mg tablet Take by mouth.    aspirin 81 mg, oral, Daily    benzonatate (TESSALON) 100 mg, oral, 3 times daily PRN, Do not crush or chew.    cholecalciferol, vitamin D3, (VITAMIN D3 ORAL) 1 tablet, Daily    finasteride (PROSCAR) 5 mg, Daily    fluocinonide 0.05 % cream APPLY TOPICALLY TO THE AFFECTED AREA TWICE DAILY AS NEEDED FOR FLARES    furosemide (LASIX) 20 mg, oral, Daily    lisinopril 20 mg, oral, Daily    magnesium oxide-Mg AA chelate (Magnesium, oxide/AA chelate,) 300 mg capsule 1 capsule, Daily    metoprolol succinate XL (TOPROL-XL) 25 mg, oral, Daily, Do not crush or chew.    NON FORMULARY Lithsone ( Brain Vitamin)    tamsulosin (FLOMAX) 0.4 mg    traZODone (DESYREL) 50 mg, oral, Nightly    zinc gluconate 50 mg tablet 1 tablet, Daily     Physical Exam:  Vitals:    02/19/25 1546   BP: (!) 169/97   BP Location: Left arm   Patient Position: Sitting   BP Cuff Size: Large adult   Pulse: 75   SpO2: 93%   Weight: 89.4 kg (197 lb)   Height: 1.753 m (5' 9\")     Wt Readings from Last 5 Encounters:   02/19/25 89.4 kg (197 lb)   01/20/25 89.8 kg (198 lb)   11/22/24 90.2 kg (198 lb 12.8 oz)   10/11/24 88.9 kg (196 lb)   08/09/24 89.8 kg (198 lb)     Physical Exam  JVP not elevated. Carotid impulses are 2+ without overlying bruit.   Chest exhibits fair to good air movement with completely clear breath sounds.   The cardiac rhythm is regular with no premature beats.   Normal S1 and S2. No gallop, murmur or rub, or click.   Abdomen is soft and benign without focal tenderness.   With no lower leg edema. The pedal pulses are intact.     Last Labs:  Lab on 01/24/2025   Component Date Value    WBC 01/24/2025 9.2     nRBC 01/24/2025 0.0     RBC 01/24/2025 5.02     Hemoglobin 01/24/2025 16.1     Hematocrit 01/24/2025 47.1     MCV 01/24/2025 94     MCH 01/24/2025 32.1     MCHC " 01/24/2025 34.2     RDW 01/24/2025 13.1     Platelets 01/24/2025 220     Neutrophils % 01/24/2025 73.8     Immature Granulocytes %,* 01/24/2025 0.3     Lymphocytes % 01/24/2025 13.3     Monocytes % 01/24/2025 7.2     Eosinophils % 01/24/2025 4.9     Basophils % 01/24/2025 0.5     Neutrophils Absolute 01/24/2025 6.77 (H)     Immature Granulocytes Ab* 01/24/2025 0.03     Lymphocytes Absolute 01/24/2025 1.22     Monocytes Absolute 01/24/2025 0.66     Eosinophils Absolute 01/24/2025 0.45 (H)     Basophils Absolute 01/24/2025 0.05     Glucose 01/24/2025 168 (H)     Sodium 01/24/2025 142     Potassium 01/24/2025 3.7     Chloride 01/24/2025 103     Bicarbonate 01/24/2025 30     Anion Gap 01/24/2025 13     Urea Nitrogen 01/24/2025 17     Creatinine 01/24/2025 1.09     eGFR 01/24/2025 66     Calcium 01/24/2025 8.9     Albumin 01/24/2025 4.2     Alkaline Phosphatase 01/24/2025 57     Total Protein 01/24/2025 6.0 (L)     AST 01/24/2025 18     Bilirubin, Total 01/24/2025 0.8     ALT 01/24/2025 26     Cholesterol 01/24/2025 165     HDL-Cholesterol 01/24/2025 30.7     Cholesterol/HDL Ratio 01/24/2025 5.4     LDL Calculated 01/24/2025 98     VLDL 01/24/2025 37     Triglycerides 01/24/2025 184 (H)     Non HDL Cholesterol 01/24/2025 134     Thyroid Stimulating Horm* 01/24/2025 2.83     Vitamin D, 25-Hydroxy, T* 01/24/2025 72     Hemoglobin A1C 01/24/2025 6.0 (H)     Estimated Average Glucose 01/24/2025 126     Uric Acid 01/24/2025 7.1     Magnesium 01/24/2025 2.32    Hospital Outpatient Visit on 10/24/2024   Component Date Value    AV pk silva 10/24/2024 1.38     LVOT diam 10/24/2024 1.96     MV E/A ratio 10/24/2024 0.58     LA vol index A/L 10/24/2024 33.5     Tricuspid annular plane * 10/24/2024 2.1     LV EF 10/24/2024 65     RV free wall pk S' 10/24/2024 14.51     LVIDd 10/24/2024 4.33     Aortic Valve Area by Con* 10/24/2024 2.21     AV pk grad 10/24/2024 7.7     LV A4C EF 10/24/2024 60.1    Lab on 10/08/2024   Component Date  Value    Glucose 10/08/2024 221 (H)     Sodium 10/08/2024 139     Potassium 10/08/2024 3.9     Chloride 10/08/2024 100     Bicarbonate 10/08/2024 30     Anion Gap 10/08/2024 13     Urea Nitrogen 10/08/2024 22     Creatinine 10/08/2024 1.03     eGFR 10/08/2024 70     Calcium 10/08/2024 9.3     Albumin 10/08/2024 4.3     Alkaline Phosphatase 10/08/2024 57     Total Protein 10/08/2024 6.2 (L)     AST 10/08/2024 12     Bilirubin, Total 10/08/2024 1.0     ALT 10/08/2024 12     WBC 10/08/2024 8.0     nRBC 10/08/2024 0.0     RBC 10/08/2024 4.95     Hemoglobin 10/08/2024 16.1     Hematocrit 10/08/2024 45.6     MCV 10/08/2024 92     MCH 10/08/2024 32.5     MCHC 10/08/2024 35.3     RDW 10/08/2024 14.0     Platelets 10/08/2024 202     Neutrophils % 10/08/2024 79.9     Immature Granulocytes %,* 10/08/2024 0.4     Lymphocytes % 10/08/2024 10.9     Monocytes % 10/08/2024 6.3     Eosinophils % 10/08/2024 2.0     Basophils % 10/08/2024 0.5     Neutrophils Absolute 10/08/2024 6.36 (H)     Immature Granulocytes Ab* 10/08/2024 0.03     Lymphocytes Absolute 10/08/2024 0.87     Monocytes Absolute 10/08/2024 0.50     Eosinophils Absolute 10/08/2024 0.16     Basophils Absolute 10/08/2024 0.04     Hemoglobin A1C 10/08/2024 5.2     Estimated Average Glucose 10/08/2024 103         Assessment/Plan   1.  Hypertension.  The patient has longstanding hypertension currently is upper 80s and age.  Patient currently on relatively high dose lisinopril.  20 mg twice daily and amlodipine 5 mg twice daily.  The patient is also on hydrochlorothiazide 25 mg daily.  Blood pressure acceptable and will modify therapy by reducing the lisinopril from 20 mg twice daily to daily.  Potentially may reduce the amlodipine in the future.  He will be started on metoprolol ER 50 mg daily.  Would discontinue hydrochlorothiazide and utilize Lasix 20 mg daily because of his complaint of edema.  The patient did have an echocardiogram 10/24/2024 showing a preserved LV  ejection fraction of 65% with 3+ mitral valve regurgitation.  Given the mitral valve regurgitation the patient potentially might be in need of the low-dose Lasix as well.  Clinically he had been walking 2 miles per day up to 2 months ago but now tires more easily with significantly reduced activity.  2.  Nondiabetic.  Lab work from 10/8/2024 included normal CBC and CMP with glycohemoglobin 5.2%.  Repeat lab from 1/24/2025 includes a glycohemoglobin of 6.0%.  Patient presently simply on dietary precautions.  3.  Hyperlipidemia.  Lipid panel 6/17/2024 includes cholesterol 210  HDL 35 triglyceride 254.  Given the presence of coronary artery calcification will begin rosuvastatin 20 mg daily.  Will lab work from 1/24/2025 demonstrates an improved lipid panel cholesterol 165 LDL 98 HDL 30 triglyceride 184.  Patient evidently taken off the rosuvastatin by primary care but it will be restarted at 20 mg daily.  Additional lab work from 1/24/2025 includes a normal CBC and CMP except for glucose of 168.  The glycohemoglobin was 6.0%.  Additional labs included TSH 2.83 vitamin D 72 uric acid 7.1 magnesium 2.32.  I4.  Former smoking.  Patient had been a former smoker 1/2/day quit 20 years ago.  5.  Coronary artery disease.  This patient had a CT angiogram on 6/17/2022 negative for pulmonary embolism positive for mild emphysema and coronary artery calcification.  Given these findings he was started on aspirin 81 mg daily plus the rosuvastatin 20 mg daily.  Patient without any anginal type chest discomfort.  He does have a sore spot over his his chest.  His primary care physician reduced his dose of Toprol-XL from 50 to 25 mg daily apparently because of fatigue which will permit for now with resting heart rate 76/min.  6.  Status post bilateral total hip replacement  7.  Status post left total knee replacement.  8.  Status post appendectomy.  9.  Status post repair of undescended testicle.          Orders:  No orders of the  defined types were placed in this encounter.     Followup Appts:  Future Appointments   Date Time Provider Department [place]   4/1/2025  1:30 PM Beulah Huynh PA-C [id number] [facility]   4/18/2025 11:00 AM Mohan Lock MD [id number] [facility]   5/30/2025 11:00 AM Dallas Zarate MD [id number] [facility]   7/21/2025  4:00 PM Mohan Lock MD [id number] [facility]   10/24/2025 11:00 AM Mohan Lock MD [id number] [facility]           ____________________________________________________________  Dallas Zarate MD  [facility]  Assistant Clinical Professor, [employer]  [facility]

## 2025-02-21 ENCOUNTER — OFFICE VISIT (OUTPATIENT)
Dept: URGENT CARE | Age: 89
End: 2025-02-21
Payer: MEDICARE

## 2025-02-21 VITALS
OXYGEN SATURATION: 95 % | BODY MASS INDEX: 26.41 KG/M2 | HEART RATE: 79 BPM | WEIGHT: 195 LBS | HEIGHT: 72 IN | SYSTOLIC BLOOD PRESSURE: 139 MMHG | DIASTOLIC BLOOD PRESSURE: 75 MMHG | RESPIRATION RATE: 18 BRPM | TEMPERATURE: 98 F

## 2025-02-21 DIAGNOSIS — R30.0 DYSURIA: Primary | ICD-10-CM

## 2025-02-21 DIAGNOSIS — R35.0 URINARY FREQUENCY: ICD-10-CM

## 2025-02-21 LAB
POC APPEARANCE, URINE: CLEAR
POC BILIRUBIN, URINE: NEGATIVE
POC BLOOD, URINE: NEGATIVE
POC COLOR, URINE: NORMAL
POC GLUCOSE, URINE: NEGATIVE MG/DL
POC KETONES, URINE: NEGATIVE MG/DL
POC LEUKOCYTES, URINE: NEGATIVE
POC NITRITE,URINE: NEGATIVE
POC PH, URINE: 6 PH
POC PROTEIN, URINE: NEGATIVE MG/DL
POC SPECIFIC GRAVITY, URINE: >=1.03
POC UROBILINOGEN, URINE: 0.2 EU/DL

## 2025-02-22 NOTE — PROGRESS NOTES
Chief Complaint   Patient presents with    Difficulty Urinating     Pt c/o increased frequency/decreased output 2-3 days. Hx: TRP trans resection prostate. Is a patient of Dr. Castano / [facility]       Physical Exam:     Abdomen is soft, non-tender, and non-distended. Mild suprapubic tenderness. No CVA tenderness.     POC Labs:     Office Visit on 02/21/2025   Component Date Value Ref Range Status    POC Color, Urine 02/21/2025 Light-Yellow  Straw, Yellow, Light-Yellow Final    POC Appearance, Urine 02/21/2025 Clear  Clear Final    POC Glucose, Urine 02/21/2025 NEGATIVE  NEGATIVE mg/dl Final    POC Bilirubin, Urine 02/21/2025 NEGATIVE  NEGATIVE Final    POC Ketones, Urine 02/21/2025 NEGATIVE  NEGATIVE mg/dl Final    POC Specific Gravity, Urine 02/21/2025 >=1.030  1.005 - 1.035 Final    POC Blood, Urine 02/21/2025 NEGATIVE  NEGATIVE Final    POC PH, Urine 02/21/2025 6.0  No Reference Range Established PH Final    POC Protein, Urine 02/21/2025 NEGATIVE  NEGATIVE mg/dl Final    POC Urobilinogen, Urine 02/21/2025 0.2  0.2, 1.0 EU/DL Final    Poc Nitrite, Urine 02/21/2025 NEGATIVE  NEGATIVE Final    POC Leukocytes, Urine 02/21/2025 NEGATIVE  NEGATIVE Final            Encounter Diagnoses   Name Primary?    Dysuria Yes    Urinary frequency             Medical Decision Making & Plan:     No signs of UTI on UA.     Follow up with PCP       Home      02/21/25 at 7:10 PM - Katherine Condon DO

## 2025-02-27 ENCOUNTER — APPOINTMENT (OUTPATIENT)
Dept: CARDIOLOGY | Facility: CLINIC | Age: 89
End: 2025-02-27
Payer: MEDICARE

## 2025-03-14 ENCOUNTER — APPOINTMENT (OUTPATIENT)
Dept: PRIMARY CARE | Facility: CLINIC | Age: 89
End: 2025-03-14
Payer: MEDICARE

## 2025-03-24 ENCOUNTER — TELEPHONE (OUTPATIENT)
Dept: HEMATOLOGY/ONCOLOGY | Facility: CLINIC | Age: 89
End: 2025-03-24
Payer: MEDICARE

## 2025-03-24 NOTE — TELEPHONE ENCOUNTER
TCT pt with request to have labs drawn prior to his appt to allow provider to review at time of appt. He verbalized understating with teach back method.

## 2025-03-25 ENCOUNTER — LAB (OUTPATIENT)
Dept: LAB | Facility: CLINIC | Age: 89
End: 2025-03-25
Payer: MEDICARE

## 2025-03-25 DIAGNOSIS — D59.10 AUTOIMMUNE HEMOLYTIC ANEMIA: ICD-10-CM

## 2025-03-25 LAB
ALBUMIN SERPL BCP-MCNC: 4.5 G/DL (ref 3.4–5)
ALP SERPL-CCNC: 59 U/L (ref 33–136)
ALT SERPL W P-5'-P-CCNC: 26 U/L (ref 10–52)
ANION GAP SERPL CALC-SCNC: 15 MMOL/L (ref 10–20)
AST SERPL W P-5'-P-CCNC: 18 U/L (ref 9–39)
BASOPHILS # BLD AUTO: 0.02 X10*3/UL (ref 0–0.1)
BASOPHILS NFR BLD AUTO: 0.2 %
BILIRUB SERPL-MCNC: 0.9 MG/DL (ref 0–1.2)
BUN SERPL-MCNC: 17 MG/DL (ref 6–23)
CALCIUM SERPL-MCNC: 9.1 MG/DL (ref 8.6–10.3)
CHLORIDE SERPL-SCNC: 101 MMOL/L (ref 98–107)
CO2 SERPL-SCNC: 28 MMOL/L (ref 21–32)
CREAT SERPL-MCNC: 0.97 MG/DL (ref 0.5–1.3)
DAT-COMPLEMENT: NORMAL
EGFRCR SERPLBLD CKD-EPI 2021: 76 ML/MIN/1.73M*2
EOSINOPHIL # BLD AUTO: 0.22 X10*3/UL (ref 0–0.4)
EOSINOPHIL NFR BLD AUTO: 2.1 %
ERYTHROCYTE [DISTWIDTH] IN BLOOD BY AUTOMATED COUNT: 13.2 % (ref 11.5–14.5)
GLUCOSE SERPL-MCNC: 203 MG/DL (ref 74–99)
HCT VFR BLD AUTO: 48.4 % (ref 41–52)
HGB BLD-MCNC: 17 G/DL (ref 13.5–17.5)
HGB RETIC QN: 36 PG (ref 28–38)
IMM GRANULOCYTES # BLD AUTO: 0.04 X10*3/UL (ref 0–0.5)
IMM GRANULOCYTES NFR BLD AUTO: 0.4 % (ref 0–0.9)
IMMATURE RETIC FRACTION: 7.3 %
LYMPHOCYTES # BLD AUTO: 0.93 X10*3/UL (ref 0.8–3)
LYMPHOCYTES NFR BLD AUTO: 8.9 %
MCH RBC QN AUTO: 32 PG (ref 26–34)
MCHC RBC AUTO-ENTMCNC: 35.1 G/DL (ref 32–36)
MCV RBC AUTO: 91 FL (ref 80–100)
MONOCYTES # BLD AUTO: 0.74 X10*3/UL (ref 0.05–0.8)
MONOCYTES NFR BLD AUTO: 7.1 %
NEUTROPHILS # BLD AUTO: 8.45 X10*3/UL (ref 1.6–5.5)
NEUTROPHILS NFR BLD AUTO: 81.3 %
NRBC BLD-RTO: 0 /100 WBCS (ref 0–0)
PLATELET # BLD AUTO: 210 X10*3/UL (ref 150–450)
POTASSIUM SERPL-SCNC: 3.8 MMOL/L (ref 3.5–5.3)
PROT SERPL-MCNC: 6.5 G/DL (ref 6.4–8.2)
RBC # BLD AUTO: 5.31 X10*6/UL (ref 4.5–5.9)
RETICS #: 0.09 X10*6/UL (ref 0.02–0.11)
RETICS/RBC NFR AUTO: 1.8 % (ref 0.5–2)
SODIUM SERPL-SCNC: 140 MMOL/L (ref 136–145)
WBC # BLD AUTO: 10.4 X10*3/UL (ref 4.4–11.3)

## 2025-03-25 PROCEDURE — 83615 LACTATE (LD) (LDH) ENZYME: CPT

## 2025-03-25 PROCEDURE — 80053 COMPREHEN METABOLIC PANEL: CPT

## 2025-03-25 PROCEDURE — 86880 COOMBS TEST DIRECT: CPT

## 2025-03-25 PROCEDURE — 83010 ASSAY OF HAPTOGLOBIN QUANT: CPT

## 2025-03-25 PROCEDURE — 85045 AUTOMATED RETICULOCYTE COUNT: CPT

## 2025-03-25 PROCEDURE — 85025 COMPLETE CBC W/AUTO DIFF WBC: CPT

## 2025-03-25 PROCEDURE — 36415 COLL VENOUS BLD VENIPUNCTURE: CPT

## 2025-03-25 PROCEDURE — 82607 VITAMIN B-12: CPT

## 2025-03-26 LAB
HAPTOGLOB SERPL NEPH-MCNC: <30 MG/DL (ref 30–200)
LDH SERPL L TO P-CCNC: 175 U/L (ref 84–246)
VIT B12 SERPL-MCNC: 403 PG/ML (ref 211–911)

## 2025-04-01 ENCOUNTER — OFFICE VISIT (OUTPATIENT)
Dept: HEMATOLOGY/ONCOLOGY | Facility: CLINIC | Age: 89
End: 2025-04-01
Payer: MEDICARE

## 2025-04-01 VITALS
WEIGHT: 195.99 LBS | DIASTOLIC BLOOD PRESSURE: 79 MMHG | TEMPERATURE: 97.9 F | HEART RATE: 73 BPM | RESPIRATION RATE: 18 BRPM | BODY MASS INDEX: 26.58 KG/M2 | SYSTOLIC BLOOD PRESSURE: 165 MMHG | OXYGEN SATURATION: 95 %

## 2025-04-01 DIAGNOSIS — D59.10 AUTOIMMUNE HEMOLYTIC ANEMIA: Primary | ICD-10-CM

## 2025-04-01 PROCEDURE — 1036F TOBACCO NON-USER: CPT | Performed by: NURSE PRACTITIONER

## 2025-04-01 PROCEDURE — 99214 OFFICE O/P EST MOD 30 MIN: CPT | Performed by: NURSE PRACTITIONER

## 2025-04-01 PROCEDURE — 1125F AMNT PAIN NOTED PAIN PRSNT: CPT | Performed by: NURSE PRACTITIONER

## 2025-04-01 PROCEDURE — 3078F DIAST BP <80 MM HG: CPT | Performed by: NURSE PRACTITIONER

## 2025-04-01 PROCEDURE — 3077F SYST BP >= 140 MM HG: CPT | Performed by: NURSE PRACTITIONER

## 2025-04-01 RX ORDER — MIRABEGRON 50 MG/1
50 TABLET, FILM COATED, EXTENDED RELEASE ORAL
COMMUNITY
Start: 2025-03-26 | End: 2025-09-22

## 2025-04-01 ASSESSMENT — ENCOUNTER SYMPTOMS
HEMOPTYSIS: 0
RESPIRATORY NEGATIVE: 1
LEG SWELLING: 0
FATIGUE: 0
APPETITE CHANGE: 0
FEVER: 0
PALPITATIONS: 0
COUGH: 0
CARDIOVASCULAR NEGATIVE: 1
EYES NEGATIVE: 1
UNEXPECTED WEIGHT CHANGE: 0
SHORTNESS OF BREATH: 0
TROUBLE SWALLOWING: 0

## 2025-04-01 ASSESSMENT — PAIN SCALES - GENERAL: PAINLEVEL_OUTOF10: 10-WORST PAIN EVER

## 2025-04-01 NOTE — PROGRESS NOTES
"Patient ID: David Motta is a 89 y.o. male.  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  Phone conversations with Dr. Lock at the time (6/2022) confirmed  the lab findings and he responded nicely to steroids with complete resolution. He had Decadron 20mg x 4-5 days.  1/2023 he presented with SOB and dizzy with his walking. He again was given 5 days of steroids. Labs again consistent with autoimmune hemolytic anemia, completed long taper.      When he first saw Dr Niño he reported no new meds though he does take many OTC supplements- including a \"liver cleanse\"  she requested he stop that medication.  Now he is wearing X39 stem cell patch to prevent infections.  He does not complain of prior dizzy spells.   He continues to drink alcohol  He takes Trazadone to help him sleep, but does't feel groggy in the morning.   He denies any bleeding or bruising.  He uses a cane due to recent fall and knee injury.  Labs done show hgb 17, ANABEL negative, ldh 175, normal reticulocytes and low haptoglobin <30  with normal kidney and liver function test.  He does not have bleeding or bruising other than small isolate bruise on left forearm.       Review of Systems   Constitutional:  Negative for appetite change, fatigue, fever and unexpected weight change.   HENT:   Negative for hearing loss, mouth sores, nosebleeds, tinnitus and trouble swallowing.    Eyes: Negative.    Respiratory: Negative.  Negative for cough, hemoptysis and shortness of breath.    Cardiovascular: Negative.  Negative for chest pain, leg swelling and palpitations.        He was evaluated by cardiology for new murmur and found to have +3 TR by ECHO and started on lasix        Objective   BSA: 2.13 meters squared  /79 (BP " Location: Right arm, Patient Position: Sitting, BP Cuff Size: Adult)   Pulse 73   Temp 36.6 °C (97.9 °F) (Temporal)   Resp 18   Wt 88.9 kg (195 lb 15.8 oz)   SpO2 95%   BMI 26.58 kg/m²      has a past medical history of Anxiety (09/14/2023), Autoimmune hemolytic anemia (09/14/2023), Back pain of lumbar region with sciatica (09/14/2023), Benign prostatic hyperplasia (09/14/2023), Depression (09/14/2023), Gastroesophageal reflux disease without esophagitis (09/14/2023), 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   has a past surgical history that includes Total hip arthroplasty (Left, 2006); Colonoscopy (2007); Appendectomy (2005); Total hip arthroplasty (Right, 2010); Trigger finger release (Right, 2015); Colonoscopy (2018); Knee surgery (Left, 2018); and Eye surgery (2019).  Family History   Problem Relation Name Age of Onset    No Known Problems Mother      No Known Problems Father      Other (High Blood Pressure) Other Family History     Cancer Other Family History          David Ángela  reports that he has quit smoking. His smoking use included cigarettes. He has been exposed to tobacco smoke. He has never used smokeless tobacco.  He  reports current alcohol use.  He  reports no history of drug use.    Physical Exam  Constitutional:       Appearance: Normal appearance.   Eyes:      Conjunctiva/sclera: Conjunctivae normal.      Pupils: Pupils are equal, round, and reactive to light.   Cardiovascular:      Rate and Rhythm: Normal rate and regular rhythm.      Pulses: Normal pulses.      Heart sounds: Normal heart sounds. No murmur heard.     Comments: Murmur less pronounced on today's visit  BP is elevated  Pulmonary:      Effort: No  respiratory distress.      Breath sounds: No stridor. No wheezing or rhonchi.   Abdominal:      General: There is no distension.      Palpations: There is no mass.      Tenderness: There is no abdominal tenderness.   Musculoskeletal:         General: No swelling or tenderness.   Lymphadenopathy:      Cervical: No cervical adenopathy.   Skin:     Coloration: Skin is not jaundiced or pale.      Findings: No bruising.   Neurological:      Motor: No weakness.     WBC   Date/Time Value Ref Range Status   03/25/2025 12:33 PM 10.4 4.4 - 11.3 x10*3/uL Final   01/24/2025 08:45 AM 9.2 4.4 - 11.3 x10*3/uL Final   10/08/2024 10:11 AM 8.0 4.4 - 11.3 x10*3/uL Final     nRBC   Date Value Ref Range Status   03/25/2025 0.0 0.0 - 0.0 /100 WBCs Final   01/24/2025 0.0 0.0 - 0.0 /100 WBCs Final   10/08/2024 0.0 0.0 - 0.0 /100 WBCs Final     RBC   Date Value Ref Range Status   03/25/2025 5.31 4.50 - 5.90 x10*6/uL Final   01/24/2025 5.02 4.50 - 5.90 x10*6/uL Final   10/08/2024 4.95 4.50 - 5.90 x10*6/uL Final     Hemoglobin   Date Value Ref Range Status   03/25/2025 17.0 13.5 - 17.5 g/dL Final   01/24/2025 16.1 13.5 - 17.5 g/dL Final   10/08/2024 16.1 13.5 - 17.5 g/dL Final     Hematocrit   Date Value Ref Range Status   03/25/2025 48.4 41.0 - 52.0 % Final   01/24/2025 47.1 41.0 - 52.0 % Final   10/08/2024 45.6 41.0 - 52.0 % Final     MCV   Date/Time Value Ref Range Status   03/25/2025 12:33 PM 91 80 - 100 fL Final   01/24/2025 08:45 AM 94 80 - 100 fL Final   10/08/2024 10:11 AM 92 80 - 100 fL Final     MCH   Date/Time Value Ref Range Status   03/25/2025 12:33 PM 32.0 26.0 - 34.0 pg Final   01/24/2025 08:45 AM 32.1 26.0 - 34.0 pg Final   10/08/2024 10:11 AM 32.5 26.0 - 34.0 pg Final     MCHC   Date/Time Value Ref Range Status   03/25/2025 12:33 PM 35.1 32.0 - 36.0 g/dL Final   01/24/2025 08:45 AM 34.2 32.0 - 36.0 g/dL Final   10/08/2024 10:11 AM 35.3 32.0 - 36.0 g/dL Final     RDW   Date/Time Value Ref Range Status   03/25/2025 12:33 PM  13.2 11.5 - 14.5 % Final   01/24/2025 08:45 AM 13.1 11.5 - 14.5 % Final   10/08/2024 10:11 AM 14.0 11.5 - 14.5 % Final     Platelets   Date/Time Value Ref Range Status   03/25/2025 12:33  150 - 450 x10*3/uL Final   01/24/2025 08:45  150 - 450 x10*3/uL Final   10/08/2024 10:11  150 - 450 x10*3/uL Final     MPV   Date/Time Value Ref Range Status   08/14/2023 12:53 PM 10.7 7.0 - 12.6 CU Final   05/21/2023 10:14 AM 10.8 7.0 - 12.6 CU Final   05/08/2023 02:34 PM 11.1 7.0 - 12.6 CU Final     Neutrophils %   Date/Time Value Ref Range Status   03/25/2025 12:33 PM 81.3 40.0 - 80.0 % Final   01/24/2025 08:45 AM 73.8 40.0 - 80.0 % Final   10/08/2024 10:11 AM 79.9 40.0 - 80.0 % Final     Immature Granulocytes %, Automated   Date/Time Value Ref Range Status   03/25/2025 12:33 PM 0.4 0.0 - 0.9 % Final     Comment:     Immature Granulocyte Count (IG) includes promyelocytes, myelocytes and metamyelocytes but does not include bands. Percent differential counts (%) should be interpreted in the context of the absolute cell counts (cells/UL).   01/24/2025 08:45 AM 0.3 0.0 - 0.9 % Final     Comment:     Immature Granulocyte Count (IG) includes promyelocytes, myelocytes and metamyelocytes but does not include bands. Percent differential counts (%) should be interpreted in the context of the absolute cell counts (cells/UL).   10/08/2024 10:11 AM 0.4 0.0 - 0.9 % Final     Comment:     Immature Granulocyte Count (IG) includes promyelocytes, myelocytes and metamyelocytes but does not include bands. Percent differential counts (%) should be interpreted in the context of the absolute cell counts (cells/UL).     Lymphocytes %   Date/Time Value Ref Range Status   03/25/2025 12:33 PM 8.9 13.0 - 44.0 % Final   01/24/2025 08:45 AM 13.3 13.0 - 44.0 % Final   10/08/2024 10:11 AM 10.9 13.0 - 44.0 % Final     Monocytes %   Date/Time Value Ref Range Status   03/25/2025 12:33 PM 7.1 2.0 - 10.0 % Final   01/24/2025 08:45 AM 7.2 2.0 -  10.0 % Final   10/08/2024 10:11 AM 6.3 2.0 - 10.0 % Final     Eosinophils %   Date/Time Value Ref Range Status   03/25/2025 12:33 PM 2.1 0.0 - 6.0 % Final   01/24/2025 08:45 AM 4.9 0.0 - 6.0 % Final   10/08/2024 10:11 AM 2.0 0.0 - 6.0 % Final     Basophils %   Date/Time Value Ref Range Status   03/25/2025 12:33 PM 0.2 0.0 - 2.0 % Final   01/24/2025 08:45 AM 0.5 0.0 - 2.0 % Final   10/08/2024 10:11 AM 0.5 0.0 - 2.0 % Final     Neutrophils Absolute   Date/Time Value Ref Range Status   03/25/2025 12:33 PM 8.45 (H) 1.60 - 5.50 x10*3/uL Final     Comment:     Percent differential counts (%) should be interpreted in the context of the absolute cell counts (cells/uL).   01/24/2025 08:45 AM 6.77 (H) 1.60 - 5.50 x10*3/uL Final     Comment:     Percent differential counts (%) should be interpreted in the context of the absolute cell counts (cells/uL).   10/08/2024 10:11 AM 6.36 (H) 1.60 - 5.50 x10*3/uL Final     Comment:     Percent differential counts (%) should be interpreted in the context of the absolute cell counts (cells/uL).     Immature Granulocytes Absolute, Automated   Date/Time Value Ref Range Status   03/25/2025 12:33 PM 0.04 0.00 - 0.50 x10*3/uL Final   01/24/2025 08:45 AM 0.03 0.00 - 0.50 x10*3/uL Final   10/08/2024 10:11 AM 0.03 0.00 - 0.50 x10*3/uL Final     Lymphocytes Absolute   Date/Time Value Ref Range Status   03/25/2025 12:33 PM 0.93 0.80 - 3.00 x10*3/uL Final   01/24/2025 08:45 AM 1.22 0.80 - 3.00 x10*3/uL Final   10/08/2024 10:11 AM 0.87 0.80 - 3.00 x10*3/uL Final     Monocytes Absolute   Date/Time Value Ref Range Status   03/25/2025 12:33 PM 0.74 0.05 - 0.80 x10*3/uL Final   01/24/2025 08:45 AM 0.66 0.05 - 0.80 x10*3/uL Final   10/08/2024 10:11 AM 0.50 0.05 - 0.80 x10*3/uL Final     Eosinophils Absolute   Date/Time Value Ref Range Status   03/25/2025 12:33 PM 0.22 0.00 - 0.40 x10*3/uL Final   01/24/2025 08:45 AM 0.45 (H) 0.00 - 0.40 x10*3/uL Final   10/08/2024 10:11 AM 0.16 0.00 - 0.40 x10*3/uL Final      Basophils Absolute   Date/Time Value Ref Range Status   03/25/2025 12:33 PM 0.02 0.00 - 0.10 x10*3/uL Final   01/24/2025 08:45 AM 0.05 0.00 - 0.10 x10*3/uL Final   10/08/2024 10:11 AM 0.04 0.00 - 0.10 x10*3/uL Final         Assessment/Plan    1. Auto immune hemolytic anemia  Relapsed after short course steroid but no issues since doing the long taper.   He  finished a 2nd course but was still hemolyzing and had to do longer course with slow wean... starting at 40mg Prednisone with a planned decline of 5mg/weekly assuming stable hemogram.  He has been off steroids  and CBC is normal today      He has some neuropathy, but not a complaint today B12 normal 4/8/23.   Energy is improving.  He sustained fall on right knee without bruising or ecchymosis.      2. Comorbidities  DJD is limiting.  Uses cane   BPH also limits his sleep   HTN noted- no new meds.   New systolic murmur evaluated with cardiology and treated with lasix and now almost undiscernable     Plan:    OV 12 months         Diagnoses and all orders for this visit:  Autoimmune hemolytic anemia  -     CBC and Auto Differential; Future  -     Comprehensive Metabolic Panel; Future  -     Vitamin B12; Future  -     Haptoglobin; Future  -     Lactate Dehydrogenase; Future  -     Reticulocytes; Future  -     Dony-Complement; Future  -     Clinic Appointment Request  -     Clinic Appointment Request; Future  -     CBC and Auto Differential; Future  -     Comprehensive Metabolic Panel; Future  -     Haptoglobin; Future  -     Lactate Dehydrogenase; Future  -     Reticulocytes; Future  -     Dony-Complement; Future           Beulah Huynh PA-C

## 2025-04-01 NOTE — PROGRESS NOTES
Patient here for follow up visit with Beulah Huynh for Dx of anemia   Patient here alone . Pt ambulated with cane. Falls precautions maintained     Medications and Allergies reviewed and reconciled this visit.    No concerns or complaints noted at this time.     Pt reports appetite is good.  Dizziness: denies   Bleeding: denies   PICA: denies   Fevers/Chills/weight loss/night sweats: denies   Energy level has decreased,  Pt reports overactive bladder - on new medication with some relief       Follow up per  PA  request.    Pt. reports availability and use of mychart, Reviewed this is a good place to communicate with the team as well as review labs and upcoming orders.     No barriers to education noted, patient agrees to current plan and verbalized understanding using teach back method.

## 2025-04-11 ENCOUNTER — APPOINTMENT (OUTPATIENT)
Dept: PRIMARY CARE | Facility: CLINIC | Age: 89
End: 2025-04-11
Payer: MEDICARE

## 2025-04-15 DIAGNOSIS — F41.9 ANXIETY: ICD-10-CM

## 2025-04-15 RX ORDER — TRAZODONE HYDROCHLORIDE 50 MG/1
50 TABLET ORAL NIGHTLY
Qty: 90 TABLET | Refills: 3 | Status: SHIPPED | OUTPATIENT
Start: 2025-04-15

## 2025-04-18 ENCOUNTER — OFFICE VISIT (OUTPATIENT)
Dept: PRIMARY CARE | Facility: CLINIC | Age: 89
End: 2025-04-18
Payer: MEDICARE

## 2025-04-18 VITALS
BODY MASS INDEX: 26.82 KG/M2 | TEMPERATURE: 97.4 F | OXYGEN SATURATION: 95 % | WEIGHT: 198 LBS | SYSTOLIC BLOOD PRESSURE: 116 MMHG | HEART RATE: 73 BPM | DIASTOLIC BLOOD PRESSURE: 76 MMHG | HEIGHT: 72 IN

## 2025-04-18 DIAGNOSIS — E11.9 TYPE 2 DIABETES MELLITUS WITHOUT COMPLICATION, WITHOUT LONG-TERM CURRENT USE OF INSULIN: Primary | ICD-10-CM

## 2025-04-18 DIAGNOSIS — I10 PRIMARY HYPERTENSION: ICD-10-CM

## 2025-04-18 DIAGNOSIS — E55.9 VITAMIN D DEFICIENCY: ICD-10-CM

## 2025-04-18 DIAGNOSIS — I10 HYPERTENSION, UNSPECIFIED TYPE: ICD-10-CM

## 2025-04-18 DIAGNOSIS — M1A.9XX0 CHRONIC GOUT WITHOUT TOPHUS, UNSPECIFIED CAUSE, UNSPECIFIED SITE: ICD-10-CM

## 2025-04-18 DIAGNOSIS — E78.2 MIXED HYPERLIPIDEMIA: ICD-10-CM

## 2025-04-18 DIAGNOSIS — K21.9 GASTROESOPHAGEAL REFLUX DISEASE WITHOUT ESOPHAGITIS: ICD-10-CM

## 2025-04-18 DIAGNOSIS — R73.9 HYPERGLYCEMIA: ICD-10-CM

## 2025-04-18 DIAGNOSIS — M47.816 LUMBAR SPONDYLOSIS: ICD-10-CM

## 2025-04-18 DIAGNOSIS — N32.81 OVERACTIVE BLADDER: ICD-10-CM

## 2025-04-18 PROCEDURE — 1160F RVW MEDS BY RX/DR IN RCRD: CPT | Performed by: INTERNAL MEDICINE

## 2025-04-18 PROCEDURE — 99214 OFFICE O/P EST MOD 30 MIN: CPT | Performed by: INTERNAL MEDICINE

## 2025-04-18 PROCEDURE — 3078F DIAST BP <80 MM HG: CPT | Performed by: INTERNAL MEDICINE

## 2025-04-18 PROCEDURE — 1125F AMNT PAIN NOTED PAIN PRSNT: CPT | Performed by: INTERNAL MEDICINE

## 2025-04-18 PROCEDURE — 1159F MED LIST DOCD IN RCRD: CPT | Performed by: INTERNAL MEDICINE

## 2025-04-18 PROCEDURE — 3074F SYST BP LT 130 MM HG: CPT | Performed by: INTERNAL MEDICINE

## 2025-04-18 PROCEDURE — 1036F TOBACCO NON-USER: CPT | Performed by: INTERNAL MEDICINE

## 2025-04-18 PROCEDURE — G2211 COMPLEX E/M VISIT ADD ON: HCPCS | Performed by: INTERNAL MEDICINE

## 2025-04-18 ASSESSMENT — LIFESTYLE VARIABLES
HOW OFTEN DURING THE LAST YEAR HAVE YOU FOUND THAT YOU WERE NOT ABLE TO STOP DRINKING ONCE YOU HAD STARTED: NEVER
HOW OFTEN DO YOU HAVE A DRINK CONTAINING ALCOHOL: 2-4 TIMES A MONTH
HOW OFTEN DO YOU HAVE SIX OR MORE DRINKS ON ONE OCCASION: NEVER
HOW OFTEN DURING THE LAST YEAR HAVE YOU BEEN UNABLE TO REMEMBER WHAT HAPPENED THE NIGHT BEFORE BECAUSE YOU HAD BEEN DRINKING: NEVER
SKIP TO QUESTIONS 9-10: 1
AUDIT-C TOTAL SCORE: 2
AUDIT TOTAL SCORE: 2
HOW MANY STANDARD DRINKS CONTAINING ALCOHOL DO YOU HAVE ON A TYPICAL DAY: 1 OR 2
HOW OFTEN DURING THE LAST YEAR HAVE YOU HAD A FEELING OF GUILT OR REMORSE AFTER DRINKING: NEVER
HAS A RELATIVE, FRIEND, DOCTOR, OR ANOTHER HEALTH PROFESSIONAL EXPRESSED CONCERN ABOUT YOUR DRINKING OR SUGGESTED YOU CUT DOWN: NO
HAVE YOU OR SOMEONE ELSE BEEN INJURED AS A RESULT OF YOUR DRINKING: NO
HOW OFTEN DURING THE LAST YEAR HAVE YOU FAILED TO DO WHAT WAS NORMALLY EXPECTED FROM YOU BECAUSE OF DRINKING: NEVER
HOW OFTEN DURING THE LAST YEAR HAVE YOU NEEDED AN ALCOHOLIC DRINK FIRST THING IN THE MORNING TO GET YOURSELF GOING AFTER A NIGHT OF HEAVY DRINKING: NEVER

## 2025-04-18 ASSESSMENT — ENCOUNTER SYMPTOMS
LOSS OF SENSATION IN FEET: 0
OCCASIONAL FEELINGS OF UNSTEADINESS: 1
ARTHRALGIAS: 1
RESPIRATORY NEGATIVE: 1
GASTROINTESTINAL NEGATIVE: 1
CARDIOVASCULAR NEGATIVE: 1
DEPRESSION: 0
FATIGUE: 1

## 2025-04-18 ASSESSMENT — PAIN SCALES - GENERAL: PAINLEVEL_OUTOF10: 3

## 2025-04-18 NOTE — PATIENT INSTRUCTIONS
Maryjo-   The only change I going to make in your medicines today as you can stop that rosuvastatin as it might be causing some fatigue/muscle aches and I am not really sure if it is doing you any good at this point in your life.    Will otherwise plan on seeing you back in 3 months with labs again before that visit.

## 2025-04-18 NOTE — PROGRESS NOTES
[facility]: [facility]  PROGRESS NOTE      David Motta is a 89 y.o. male that is presenting today for Follow-up.    Assessment/Plan   Diagnoses and all orders for this visit:  Type 2 diabetes mellitus without complication, without long-term current use of insulin  -     Albumin-Creatinine Ratio, Urine Random; Future  Lumbar spondylosis  Primary hypertension  -     CBC and Auto Differential; Future  -     Comprehensive Metabolic Panel; Future  Gastroesophageal reflux disease without esophagitis  Mixed hyperlipidemia  -     Lipid Panel; Future  -     TSH with reflex to Free T4 if abnormal; Future  Chronic gout without tophus, unspecified cause, unspecified site  Overactive bladder  Hypertension, unspecified type  Hyperglycemia  -     Hemoglobin A1C; Future  Vitamin D deficiency  -     Vitamin D 25-Hydroxy,Total (for eval of Vitamin D levels); Future  Other orders  -     Follow Up In Primary Care - Established  -     Follow Up In Primary Care - Established; Future  We reviewed his current situation reviewed the blood work that was recently done as well for the hematology team.  1.  Diabetes-doing reasonably well his most recent hemoglobin A1c was acceptable we will just see where he is in 3 months  2.  Hypertension-stable on current products  3.  Hyperlipidemia-he currently is taking a statin I think he can stop this medication as perhaps this is contributing some to his fatigue.  4.  History of gout no problems right now  5.  History of BPH and overactive bladder medications as per his urology team I do believe he understands the potential side effects of those medications.    I will just see Luke back in 3 months unless needed  Subjective   This 89-year-old is here for a follow-up visit.  He for the most part is doing okay with the exception that he has been having a lot of bladder problems and is seeing the urologist with adjustments of his medicines.  They are now trying Myrbetriq in  addition to his Flomax to see if they can get things to be in a better place but he is still very bothered in that regard.  He also has some constipation for which she is having to work on moving his bowels.    His energy level is still fair he is not having any heart issues right now.      Review of Systems   Constitutional:  Positive for fatigue.   Respiratory: Negative.     Cardiovascular: Negative.    Gastrointestinal: Negative.    Genitourinary: Negative.    Musculoskeletal:  Positive for arthralgias.      Objective   Vitals:    04/18/25 1043   BP: 116/76   Pulse: 73   Temp: 36.3 °C (97.4 °F)   SpO2: 95%      Body mass index is 26.85 kg/m².  Physical Exam  Cardiovascular:      Rate and Rhythm: Normal rate and regular rhythm.      Pulses: Normal pulses.      Heart sounds: Normal heart sounds.   Pulmonary:      Effort: Pulmonary effort is normal.      Breath sounds: Normal breath sounds.   Abdominal:      General: Abdomen is flat. Bowel sounds are normal.      Palpations: Abdomen is soft.   Musculoskeletal:         General: Normal range of motion.       Diagnostic Results   Lab Results   Component Value Date    GLUCOSE 203 (H) 03/25/2025    CALCIUM 9.1 03/25/2025     03/25/2025    K 3.8 03/25/2025    CO2 28 03/25/2025     03/25/2025    BUN 17 03/25/2025    CREATININE 0.97 03/25/2025     Lab Results   Component Value Date    ALT 26 03/25/2025    AST 18 03/25/2025    ALKPHOS 59 03/25/2025    BILITOT 0.9 03/25/2025     Lab Results   Component Value Date    WBC 10.4 03/25/2025    HGB 17.0 03/25/2025    HCT 48.4 03/25/2025    MCV 91 03/25/2025     03/25/2025     Lab Results   Component Value Date    CHOL 165 01/24/2025    CHOL 210 (H) 06/17/2024    CHOL 191 04/15/2022     Lab Results   Component Value Date    HDL 30.7 01/24/2025    HDL 35.0 (L) 06/17/2024    HDL 40 (L) 04/15/2022     Lab Results   Component Value Date    LDLCALC 98 01/24/2025    LDLCALC 124 06/17/2024    LDLCALC 130 04/15/2022  "    Lab Results   Component Value Date    TRIG 184 (H) 01/24/2025    TRIG 254 (H) 06/17/2024    TRIG 104 04/15/2022     No components found for: \"CHOLHDL\"  Lab Results   Component Value Date    HGBA1C 6.0 (H) 01/24/2025     Other labs not included in the list above were reviewed either before or during this encounter.    History    Medical History[1]  Surgical History[2]  Family History[3]  Social History     Socioeconomic History    Marital status:      Spouse name: Not on file    Number of children: Not on file    Years of education: Not on file    Highest education level: Not on file   Occupational History    Not on file   Tobacco Use    Smoking status: Former     Types: Cigarettes     Passive exposure: Past    Smokeless tobacco: Never   Vaping Use    Vaping status: Never Used   Substance and Sexual Activity    Alcohol use: Yes     Comment: occasional    Drug use: Never    Sexual activity: Not on file   Other Topics Concern    Not on file   Social History Narrative    Not on file     Social Drivers of Health     Financial Resource Strain: Not on file   Food Insecurity: Not on file   Transportation Needs: Not on file   Physical Activity: Not on file   Stress: Not on file   Social Connections: Not on file   Intimate Partner Violence: Not on file   Housing Stability: Not on file     Allergies[4]  Medications Ordered Prior to Encounter[5]  Immunization History   Administered Date(s) Administered    Flu vaccine, quadrivalent, high-dose, preservative free, age 65y+ (FLUZONE) 12/03/2021, 10/19/2022    Flu vaccine, trivalent, preservative free, HIGH-DOSE, age 65y+ (Fluzone) 10/11/2024    Influenza, Unspecified 10/19/2022    Moderna SARS-CoV-2 Vaccination 04/16/2021, 05/13/2021    Pneumococcal conjugate vaccine, 13-valent (PREVNAR 13) 06/06/2016    Pneumococcal conjugate vaccine, 20-valent (PREVNAR 20) 10/11/2024    Td vaccine, age 7 years and older (TDVAX) 07/03/2003, 04/23/2013    Tdap vaccine, age 7 year and " older (BOOSTRIX, ADACEL) 02/03/2016    Zoster, live 05/06/2014     Patient's medical history was reviewed and updated either before or during this encounter.       oMhan Lock MD       [1]   Past Medical History:  Diagnosis Date    Anxiety 09/14/2023    Autoimmune hemolytic anemia 09/14/2023    Back pain of lumbar region with sciatica 09/14/2023    Benign prostatic hyperplasia 09/14/2023    Depression 09/14/2023    Gastroesophageal reflux disease without esophagitis 09/14/2023    Hematuria syndrome 09/14/2023    Hyperlipidemia 09/14/2023    Hypertension 09/14/2023    Lumbar spondylosis 09/14/2023    Neuropathy 09/14/2023    Osteoarthritis 09/14/2023    Overactive bladder 09/14/2023    Type 2 diabetes mellitus without complication, without long-term current use of insulin 09/14/2023   [2]   Past Surgical History:  Procedure Laterality Date    APPENDECTOMY  2005    COLONOSCOPY  2007    also 2010    COLONOSCOPY  2018    EYE SURGERY  2019    KNEE SURGERY Left 2018    TOTAL HIP ARTHROPLASTY Left 2006    TOTAL HIP ARTHROPLASTY Right 2010    TRIGGER FINGER RELEASE Right 2015   [3]   Family History  Problem Relation Name Age of Onset    No Known Problems Mother      No Known Problems Father      Other (High Blood Pressure) Other Family History     Cancer Other Family History    [4]   Allergies  Allergen Reactions    Sulfa (Sulfonamide Antibiotics) Other    Levofloxacin Hives    Nitrofurantoin Monohyd/M-Cryst Itching    Atorvastatin Other    Allopurinol Rash    Amoxicillin Rash    Dexamethasone Dizziness    Gabapentin Other     Daughter states patient is allergic    Pravastatin Sodium Myalgia   [5]   Current Outpatient Medications on File Prior to Visit   Medication Sig Dispense Refill    amLODIPine (Norvasc) 5 mg tablet Take 1 tablet (5 mg) by mouth once daily.      ascorbic acid (Vitamin C) 500 mg tablet Take by mouth.      aspirin 81 mg EC tablet Take 1 tablet (81 mg) by mouth once daily. 90 tablet 3     cholecalciferol, vitamin D3, (VITAMIN D3 ORAL) Take 1 tablet by mouth once daily.      finasteride (Proscar) 5 mg tablet Take 1 tablet (5 mg) by mouth once daily. Do not crush, chew, or split.      furosemide (Lasix) 20 mg tablet Take 1 tablet (20 mg) by mouth once daily. 90 tablet 3    lisinopril 20 mg tablet Take 1 tablet (20 mg) by mouth once daily. 180 tablet 3    magnesium oxide-Mg AA chelate (Magnesium, oxide/AA chelate,) 300 mg capsule Take 1 capsule (300 mg) by mouth once daily.      metoprolol succinate XL (Toprol-XL) 25 mg 24 hr tablet Take 1 tablet (25 mg) by mouth once daily. Do not crush or chew. 90 tablet 3    mirabegron (Myrbetriq) 50 mg tablet extended release 24 hr 24 hr tablet Take 1 tablet (50 mg) by mouth once daily.      NON FORMULARY Lithsone ( Brain Vitamin)      tamsulosin (Flomax) 0.4 mg 24 hr capsule Take 1 capsule (0.4 mg) by mouth. AT BEDTIME      traZODone (Desyrel) 50 mg tablet TAKE 1 TABLET BY MOUTH ONCE  DAILY AT BEDTIME 90 tablet 3    zinc gluconate 50 mg tablet Take 1 tablet (50 mg) by mouth once daily.      [DISCONTINUED] fluocinonide 0.05 % cream APPLY TOPICALLY TO THE AFFECTED AREA TWICE DAILY AS NEEDED FOR FLARES      [DISCONTINUED] rosuvastatin (Crestor) 20 mg tablet Take 1 tablet (20 mg) by mouth once daily. 90 tablet 3    [DISCONTINUED] traZODone (Desyrel) 50 mg tablet TAKE 1 TABLET BY MOUTH ONCE  DAILY AT BEDTIME 90 tablet 3     No current facility-administered medications on file prior to visit.

## 2025-05-05 DIAGNOSIS — I10 HYPERTENSION, UNSPECIFIED TYPE: ICD-10-CM

## 2025-05-05 RX ORDER — LISINOPRIL 20 MG/1
20 TABLET ORAL DAILY
Qty: 180 TABLET | Refills: 3 | Status: SHIPPED | OUTPATIENT
Start: 2025-05-05

## 2025-05-30 ENCOUNTER — APPOINTMENT (OUTPATIENT)
Dept: CARDIOLOGY | Facility: CLINIC | Age: 89
End: 2025-05-30
Payer: MEDICARE

## 2025-06-20 ENCOUNTER — OFFICE VISIT (OUTPATIENT)
Dept: URGENT CARE | Age: 89
End: 2025-06-20
Payer: MEDICARE

## 2025-06-20 VITALS
SYSTOLIC BLOOD PRESSURE: 145 MMHG | OXYGEN SATURATION: 97 % | RESPIRATION RATE: 17 BRPM | TEMPERATURE: 97.6 F | DIASTOLIC BLOOD PRESSURE: 82 MMHG | WEIGHT: 196.5 LBS | BODY MASS INDEX: 26.65 KG/M2 | HEART RATE: 92 BPM

## 2025-06-20 DIAGNOSIS — H60.501 ACUTE OTITIS EXTERNA OF RIGHT EAR, UNSPECIFIED TYPE: Primary | ICD-10-CM

## 2025-06-20 RX ORDER — CIPROFLOXACIN AND DEXAMETHASONE 3; 1 MG/ML; MG/ML
4 SUSPENSION/ DROPS AURICULAR (OTIC) 2 TIMES DAILY
Qty: 7.5 ML | Refills: 0 | Status: SHIPPED | OUTPATIENT
Start: 2025-06-20 | End: 2025-06-20 | Stop reason: ENTERED-IN-ERROR

## 2025-06-20 RX ORDER — NEOMYCIN SULFATE, POLYMYXIN B SULFATE, HYDROCORTISONE 3.5; 10000; 1 MG/ML; [USP'U]/ML; MG/ML
3 SOLUTION/ DROPS AURICULAR (OTIC) 4 TIMES DAILY
Qty: 6 ML | Refills: 0 | Status: SHIPPED | OUTPATIENT
Start: 2025-06-20 | End: 2025-06-30

## 2025-06-20 NOTE — PROGRESS NOTES
Subjective   Patient ID: David Motta is a 89 y.o. male. They present today with a chief complaint of Earache (Patient is here for right ear pressure and pain X3-4 days. ).    History of Present Illness  Patient is an 89-year-old male presents with right ear pain for the past 3 to 4 days.  States that has been congested over the past 3 to 4 weeks which has resolved and now has pain in the right ear.  States that he is having trouble getting his hearing aids in and has had a history where he has scratched the inside of his ear with his hearing aid and then developed an infection.  He was provided drops at that time which alleviated his symptoms.  Notes that the pain radiates to the right jaw.    Past Medical History  Allergies as of 06/20/2025 - Reviewed 06/20/2025   Allergen Reaction Noted    Sulfa (sulfonamide antibiotics) Other 09/14/2023    Levofloxacin Hives 09/14/2023    Nitrofurantoin monohyd/m-cryst Itching 09/14/2023    Atorvastatin Other 09/14/2023    Allopurinol Rash 03/15/2024    Amoxicillin Rash 02/19/2025    Dexamethasone Dizziness 03/14/2024    Gabapentin Other 10/11/2024    Pravastatin sodium Myalgia 09/14/2023       Prescriptions Prior to Admission[1]     Medical History[2]    Surgical History[3]     reports that he has quit smoking. His smoking use included cigarettes. He has been exposed to tobacco smoke. He has never used smokeless tobacco. He reports current alcohol use. He reports that he does not use drugs.    Review of Systems  ROS is negative unless otherwise stated in HPI.         Objective    Vitals:    06/20/25 0943   BP: 145/82   BP Location: Left arm   Patient Position: Sitting   BP Cuff Size: Adult   Pulse: 92   Resp: 17   Temp: 36.4 °C (97.6 °F)   TempSrc: Oral   SpO2: 97%   Weight: 89.1 kg (196 lb 8 oz)     No LMP for male patient.      VS: As documented in the triage note and EMR flowsheet from this visit was reviewed  General: Well appearing. No acute distress.   Eyes:   Extraocular movements grossly intact. No scleral icterus.   Head: Atraumatic. Normocephalic.     Neck: No meningismus. No gross masses. Full movement through range of motion  ENT: Left ear canal is clear.  Left tympanic membrane pearly gray.  Right ear canal is edematous with some overlying crusting.  Right TM pearly gray.  CV: Regular rhythm. No murmurs, rubs, gallops appreciated.   Resp: Clear to auscultation bilaterally. No respiratory distress.    Neuro: CN II-VII intact. A&O x3. Speech fluent. Alert. Moving all extremities. Ambulates with normal gait  Psych: Appropriate mood and affect for situation      Point of Care Test & Imaging Results from this visit  No results found for this visit on 06/20/25.   Imaging  No results found.    Cardiology, Vascular, and Other Imaging  No other imaging results found for the past 2 days      Diagnostic study results (if any) were reviewed by Noni Giordano PA-C.    Assessment/Plan   Allergies, medications, history, and pertinent labs/EKGs/Imaging reviewed by Noni Giordano PA-C.     Medical Decision Making  Patient is an 89-year-old male who presents with right ear pain.  On examination, patient well-appearing.  Right ear canal is edematous with overlying crusting.  Notes prior incident of scratching the inside of his ear with a hearing aid and having to be prescribed drops for ear infection.  Will prescribe Cortisporin drops as patient has had success with this in the past.  Advised on follow up for recurrent symptoms.     Orders and Diagnoses  Diagnoses and all orders for this visit:  Acute otitis externa of right ear, unspecified type  -     neomycin-polymyxin-HC (Cortisporin) otic solution; Administer 3 drops into the right ear 4 times a day for 10 days.      Medical Admin Record      Patient disposition: Home    Electronically signed by Noni Giordano PA-C  10:35 AM           [1] (Not in a hospital admission)   [2]   Past Medical History:  Diagnosis Date     Anxiety 09/14/2023    Autoimmune hemolytic anemia 09/14/2023    Back pain of lumbar region with sciatica 09/14/2023    Benign prostatic hyperplasia 09/14/2023    Depression 09/14/2023    Gastroesophageal reflux disease without esophagitis 09/14/2023    Hematuria syndrome 09/14/2023    Hyperlipidemia 09/14/2023    Hypertension 09/14/2023    Lumbar spondylosis 09/14/2023    Neuropathy 09/14/2023    Osteoarthritis 09/14/2023    Overactive bladder 09/14/2023    Type 2 diabetes mellitus without complication, without long-term current use of insulin 09/14/2023   [3]   Past Surgical History:  Procedure Laterality Date    APPENDECTOMY  2005    COLONOSCOPY  2007    also 2010    COLONOSCOPY  2018    EYE SURGERY  2019    KNEE SURGERY Left 2018    TOTAL HIP ARTHROPLASTY Left 2006    TOTAL HIP ARTHROPLASTY Right 2010    TRIGGER FINGER RELEASE Right 2015

## 2025-06-30 ENCOUNTER — OFFICE VISIT (OUTPATIENT)
Dept: URGENT CARE | Age: 89
End: 2025-06-30
Payer: MEDICARE

## 2025-06-30 VITALS
BODY MASS INDEX: 26.55 KG/M2 | OXYGEN SATURATION: 96 % | HEIGHT: 72 IN | TEMPERATURE: 98.4 F | WEIGHT: 196 LBS | HEART RATE: 63 BPM | DIASTOLIC BLOOD PRESSURE: 86 MMHG | SYSTOLIC BLOOD PRESSURE: 152 MMHG | RESPIRATION RATE: 16 BRPM

## 2025-06-30 DIAGNOSIS — H60.502 ACUTE OTITIS EXTERNA OF LEFT EAR, UNSPECIFIED TYPE: Primary | ICD-10-CM

## 2025-06-30 RX ORDER — HYDROCORTISONE AND ACETIC ACID 20.75; 10.375 MG/ML; MG/ML
5 SOLUTION AURICULAR (OTIC) 4 TIMES DAILY
Qty: 10 ML | Refills: 0 | Status: SHIPPED | OUTPATIENT
Start: 2025-06-30 | End: 2025-07-10

## 2025-06-30 ASSESSMENT — PAIN SCALES - GENERAL: PAINLEVEL_OUTOF10: 0-NO PAIN

## 2025-06-30 NOTE — PROGRESS NOTES
Subjective   Patient ID: David Motta is a 89 y.o. male. They present today with a chief complaint of Earache (Bilateral ear pain. Patient states it went away some but never fully went away).    History of Present Illness  Patient is an 89-year-old male who presents with right-sided ear pain patient was recently treated for external ear infection with drops however when he went to the audiologist today he was told that his infection was still present and to be seen for it again.  States that he has been having trouble putting his hearing aids in secondary to his symptoms.    Past Medical History  Allergies as of 06/30/2025 - Reviewed 06/20/2025   Allergen Reaction Noted    Sulfa (sulfonamide antibiotics) Other 09/14/2023    Levofloxacin Hives and Unknown 09/14/2023    Nitrofurantoin monohyd/m-cryst Itching 09/14/2023    Atorvastatin Other 09/14/2023    Allopurinol Rash 03/15/2024    Amoxicillin Rash 02/19/2025    Dexamethasone Dizziness 03/14/2024    Gabapentin Other 10/11/2024    Pravastatin sodium Myalgia 09/14/2023       Prescriptions Prior to Admission[1]     Medical History[2]    Surgical History[3]     reports that he has quit smoking. His smoking use included cigarettes. He has been exposed to tobacco smoke. He has never used smokeless tobacco. He reports current alcohol use. He reports that he does not use drugs.    Review of Systems  ROS is negative unless otherwise stated in HPI.         Objective    Vitals:    06/30/25 1426   BP: 152/86   BP Location: Right arm   Patient Position: Sitting   Pulse: 63   Resp: 16   Temp: 36.9 °C (98.4 °F)   TempSrc: Oral   SpO2: 96%   Weight: 88.9 kg (196 lb)   Height: 1.829 m (6')     No LMP for male patient.      VS: As documented in the triage note and EMR flowsheet from this visit was reviewed  General: Well appearing. No acute distress.   Eyes:  Extraocular movements grossly intact. No scleral icterus.   Head: Atraumatic. Normocephalic.     Neck: No meningismus. No  gross masses. Full movement through range of motion  ENT: Right ear canal is erythematous, edematous with significant crusting.  Left ear canal is clear.  TMs are pearly gray bilaterally.  CV: Regular rhythm. No murmurs, rubs, gallops appreciated.   Resp: Clear to auscultation bilaterally. No respiratory distress.    Neuro: CN II-VII intact. A&O x3. Speech fluent. Alert. Moving all extremities. Ambulates with normal gait  Psych: Appropriate mood and affect for situation      Point of Care Test & Imaging Results from this visit  No results found for this visit on 06/30/25.   Imaging  No results found.    Cardiology, Vascular, and Other Imaging  No other imaging results found for the past 2 days      Diagnostic study results (if any) were reviewed by Noni Giordano PA-C.    Assessment/Plan   Allergies, medications, history, and pertinent labs/EKGs/Imaging reviewed by Noni Giordano PA-C.     Medical Decision Making  Patient is an 89-year-old male who presents for right ear pain.  Was seen for similar x 10 days ago and written for Cortisporin drops however states that his symptoms have been persistent and he is still unable to get his hearing aid in.  On examination, he does have persistent erythema, edema and crusting of the right ear canal.  Patient unfortunately has an allergy to fluoroquinolones.  Will trial acetic acid and hydrocortisone or otic solution.  Advised to follow-up as scheduled.    Orders and Diagnoses  Diagnoses and all orders for this visit:  Acute otitis externa of left ear, unspecified type  -     hydrocortisone-acetic acid (Vosol-HC) otic solution; Administer 5 drops into the right ear 4 times a day for 10 days.      Medical Admin Record      Patient disposition: Home    Electronically signed by Noni Giordano PA-C  6:20 PM           [1] (Not in a hospital admission)   [2]   Past Medical History:  Diagnosis Date    Anxiety 09/14/2023    Autoimmune hemolytic anemia 09/14/2023    Back pain of  lumbar region with sciatica 09/14/2023    Benign prostatic hyperplasia 09/14/2023    Depression 09/14/2023    Gastroesophageal reflux disease without esophagitis 09/14/2023    Hematuria syndrome 09/14/2023    Hyperlipidemia 09/14/2023    Hypertension 09/14/2023    Lumbar spondylosis 09/14/2023    Neuropathy 09/14/2023    Osteoarthritis 09/14/2023    Overactive bladder 09/14/2023    Type 2 diabetes mellitus without complication, without long-term current use of insulin 09/14/2023   [3]   Past Surgical History:  Procedure Laterality Date    APPENDECTOMY  2005    COLONOSCOPY  2007    also 2010    COLONOSCOPY  2018    EYE SURGERY  2019    KNEE SURGERY Left 2018    TOTAL HIP ARTHROPLASTY Left 2006    TOTAL HIP ARTHROPLASTY Right 2010    TRIGGER FINGER RELEASE Right 2015

## 2025-07-04 DIAGNOSIS — E55.9 VITAMIN D DEFICIENCY: ICD-10-CM

## 2025-07-04 DIAGNOSIS — R73.9 HYPERGLYCEMIA: ICD-10-CM

## 2025-07-04 DIAGNOSIS — E11.9 TYPE 2 DIABETES MELLITUS WITHOUT COMPLICATION, WITHOUT LONG-TERM CURRENT USE OF INSULIN: ICD-10-CM

## 2025-07-04 DIAGNOSIS — E78.2 MIXED HYPERLIPIDEMIA: ICD-10-CM

## 2025-07-04 DIAGNOSIS — I10 PRIMARY HYPERTENSION: ICD-10-CM

## 2025-07-18 LAB
25(OH)D3+25(OH)D2 SERPL-MCNC: 75 NG/ML (ref 30–100)
ALBUMIN SERPL-MCNC: 4.3 G/DL (ref 3.6–5.1)
ALBUMIN/CREAT UR: 31 MG/G CREAT
ALP SERPL-CCNC: 61 U/L (ref 35–144)
ALT SERPL-CCNC: 17 U/L (ref 9–46)
ANION GAP SERPL CALCULATED.4IONS-SCNC: 13 MMOL/L (CALC) (ref 7–17)
AST SERPL-CCNC: 15 U/L (ref 10–35)
BASOPHILS # BLD AUTO: 16 CELLS/UL (ref 0–200)
BASOPHILS NFR BLD AUTO: 0.2 %
BILIRUB SERPL-MCNC: 0.8 MG/DL (ref 0.2–1.2)
BUN SERPL-MCNC: 19 MG/DL (ref 7–25)
CALCIUM SERPL-MCNC: 9.6 MG/DL (ref 8.6–10.3)
CHLORIDE SERPL-SCNC: 99 MMOL/L (ref 98–110)
CHOLEST SERPL-MCNC: 229 MG/DL
CHOLEST/HDLC SERPL: 6.4 (CALC)
CO2 SERPL-SCNC: 25 MMOL/L (ref 20–32)
CREAT SERPL-MCNC: 1.04 MG/DL (ref 0.7–1.22)
CREAT UR-MCNC: 29 MG/DL (ref 20–320)
EGFRCR SERPLBLD CKD-EPI 2021: 69 ML/MIN/1.73M2
EOSINOPHIL # BLD AUTO: 172 CELLS/UL (ref 15–500)
EOSINOPHIL NFR BLD AUTO: 2.1 %
ERYTHROCYTE [DISTWIDTH] IN BLOOD BY AUTOMATED COUNT: 12.3 % (ref 11–15)
EST. AVERAGE GLUCOSE BLD GHB EST-MCNC: 137 MG/DL
EST. AVERAGE GLUCOSE BLD GHB EST-SCNC: 7.6 MMOL/L
GLUCOSE SERPL-MCNC: 278 MG/DL (ref 65–99)
HBA1C MFR BLD: 6.4 %
HCT VFR BLD AUTO: 50.1 % (ref 38.5–50)
HDLC SERPL-MCNC: 36 MG/DL
HGB BLD-MCNC: 16.9 G/DL (ref 13.2–17.1)
LDLC SERPL CALC-MCNC: 144 MG/DL (CALC)
LYMPHOCYTES # BLD AUTO: 836 CELLS/UL (ref 850–3900)
LYMPHOCYTES NFR BLD AUTO: 10.2 %
MCH RBC QN AUTO: 32.6 PG (ref 27–33)
MCHC RBC AUTO-ENTMCNC: 33.7 G/DL (ref 32–36)
MCV RBC AUTO: 96.5 FL (ref 80–100)
MICROALBUMIN UR-MCNC: 0.9 MG/DL
MONOCYTES # BLD AUTO: 549 CELLS/UL (ref 200–950)
MONOCYTES NFR BLD AUTO: 6.7 %
NEUTROPHILS # BLD AUTO: 6626 CELLS/UL (ref 1500–7800)
NEUTROPHILS NFR BLD AUTO: 80.8 %
NONHDLC SERPL-MCNC: 193 MG/DL (CALC)
PLATELET # BLD AUTO: 206 THOUSAND/UL (ref 140–400)
PMV BLD REES-ECKER: 10.7 FL (ref 7.5–12.5)
POTASSIUM SERPL-SCNC: 3.9 MMOL/L (ref 3.5–5.3)
PROT SERPL-MCNC: 6.3 G/DL (ref 6.1–8.1)
RBC # BLD AUTO: 5.19 MILLION/UL (ref 4.2–5.8)
SODIUM SERPL-SCNC: 137 MMOL/L (ref 135–146)
TRIGL SERPL-MCNC: 321 MG/DL
TSH SERPL-ACNC: 1.69 MIU/L (ref 0.4–4.5)
WBC # BLD AUTO: 8.2 THOUSAND/UL (ref 3.8–10.8)

## 2025-07-21 ENCOUNTER — APPOINTMENT (OUTPATIENT)
Dept: CARDIOLOGY | Facility: CLINIC | Age: 89
End: 2025-07-21
Payer: MEDICARE

## 2025-07-21 ENCOUNTER — OFFICE VISIT (OUTPATIENT)
Dept: PRIMARY CARE | Facility: CLINIC | Age: 89
End: 2025-07-21
Payer: MEDICARE

## 2025-07-21 VITALS
BODY MASS INDEX: 26.55 KG/M2 | HEART RATE: 72 BPM | OXYGEN SATURATION: 94 % | TEMPERATURE: 97.4 F | WEIGHT: 196 LBS | DIASTOLIC BLOOD PRESSURE: 86 MMHG | HEIGHT: 72 IN | SYSTOLIC BLOOD PRESSURE: 138 MMHG

## 2025-07-21 DIAGNOSIS — N32.81 OVERACTIVE BLADDER: ICD-10-CM

## 2025-07-21 DIAGNOSIS — K21.9 GASTROESOPHAGEAL REFLUX DISEASE WITHOUT ESOPHAGITIS: ICD-10-CM

## 2025-07-21 DIAGNOSIS — F32.A DEPRESSION, UNSPECIFIED DEPRESSION TYPE: ICD-10-CM

## 2025-07-21 DIAGNOSIS — E55.9 VITAMIN D DEFICIENCY: ICD-10-CM

## 2025-07-21 DIAGNOSIS — E78.2 MIXED HYPERLIPIDEMIA: ICD-10-CM

## 2025-07-21 DIAGNOSIS — M10.9 GOUT, UNSPECIFIED CAUSE, UNSPECIFIED CHRONICITY, UNSPECIFIED SITE: ICD-10-CM

## 2025-07-21 DIAGNOSIS — N40.0 BENIGN PROSTATIC HYPERPLASIA, UNSPECIFIED WHETHER LOWER URINARY TRACT SYMPTOMS PRESENT: ICD-10-CM

## 2025-07-21 DIAGNOSIS — M47.816 LUMBAR SPONDYLOSIS: ICD-10-CM

## 2025-07-21 DIAGNOSIS — I10 PRIMARY HYPERTENSION: ICD-10-CM

## 2025-07-21 DIAGNOSIS — Z00.00 ANNUAL PHYSICAL EXAM: Primary | ICD-10-CM

## 2025-07-21 DIAGNOSIS — E11.9 TYPE 2 DIABETES MELLITUS WITHOUT COMPLICATION, WITHOUT LONG-TERM CURRENT USE OF INSULIN: ICD-10-CM

## 2025-07-21 DIAGNOSIS — M1A.9XX0 CHRONIC GOUT WITHOUT TOPHUS, UNSPECIFIED CAUSE, UNSPECIFIED SITE: ICD-10-CM

## 2025-07-21 DIAGNOSIS — I10 HYPERTENSION, UNSPECIFIED TYPE: ICD-10-CM

## 2025-07-21 DIAGNOSIS — M19.90 OSTEOARTHRITIS, UNSPECIFIED OSTEOARTHRITIS TYPE, UNSPECIFIED SITE: ICD-10-CM

## 2025-07-21 DIAGNOSIS — R73.9 HYPERGLYCEMIA: ICD-10-CM

## 2025-07-21 PROCEDURE — 99213 OFFICE O/P EST LOW 20 MIN: CPT | Mod: 25 | Performed by: INTERNAL MEDICINE

## 2025-07-21 PROCEDURE — 3075F SYST BP GE 130 - 139MM HG: CPT | Performed by: INTERNAL MEDICINE

## 2025-07-21 PROCEDURE — 1124F ACP DISCUSS-NO DSCNMKR DOCD: CPT | Performed by: INTERNAL MEDICINE

## 2025-07-21 PROCEDURE — 3079F DIAST BP 80-89 MM HG: CPT | Performed by: INTERNAL MEDICINE

## 2025-07-21 PROCEDURE — 99215 OFFICE O/P EST HI 40 MIN: CPT | Performed by: INTERNAL MEDICINE

## 2025-07-21 PROCEDURE — 1160F RVW MEDS BY RX/DR IN RCRD: CPT | Performed by: INTERNAL MEDICINE

## 2025-07-21 PROCEDURE — 1126F AMNT PAIN NOTED NONE PRSNT: CPT | Performed by: INTERNAL MEDICINE

## 2025-07-21 PROCEDURE — 99213 OFFICE O/P EST LOW 20 MIN: CPT | Performed by: INTERNAL MEDICINE

## 2025-07-21 PROCEDURE — G0439 PPPS, SUBSEQ VISIT: HCPCS | Performed by: INTERNAL MEDICINE

## 2025-07-21 PROCEDURE — G2211 COMPLEX E/M VISIT ADD ON: HCPCS | Performed by: INTERNAL MEDICINE

## 2025-07-21 PROCEDURE — 1159F MED LIST DOCD IN RCRD: CPT | Performed by: INTERNAL MEDICINE

## 2025-07-21 ASSESSMENT — PATIENT HEALTH QUESTIONNAIRE - PHQ9
2. FEELING DOWN, DEPRESSED OR HOPELESS: NOT AT ALL
SUM OF ALL RESPONSES TO PHQ9 QUESTIONS 1 AND 2: 0
1. LITTLE INTEREST OR PLEASURE IN DOING THINGS: NOT AT ALL

## 2025-07-21 ASSESSMENT — ENCOUNTER SYMPTOMS
NEUROLOGICAL NEGATIVE: 1
ACTIVITY CHANGE: 0
GASTROINTESTINAL NEGATIVE: 1
BACK PAIN: 1
RESPIRATORY NEGATIVE: 1
CARDIOVASCULAR NEGATIVE: 1
EYES NEGATIVE: 1
FATIGUE: 1
PSYCHIATRIC NEGATIVE: 1
ARTHRALGIAS: 1
ENDOCRINE NEGATIVE: 1
HEMATOLOGIC/LYMPHATIC NEGATIVE: 1
ALLERGIC/IMMUNOLOGIC NEGATIVE: 1

## 2025-07-21 ASSESSMENT — PAIN SCALES - GENERAL: PAINLEVEL_OUTOF10: 0-NO PAIN

## 2025-07-21 NOTE — PROGRESS NOTES
[facility]: [facility]  MEDICARE WELLNESS EXAM      David Motta is a 89 y.o. male that is presenting today for Annual Exam.    Assessment/Plan    Diagnoses and all orders for this visit:  Annual physical exam  Type 2 diabetes mellitus without complication, without long-term current use of insulin  Lumbar spondylosis  Primary hypertension  -     CBC and Auto Differential; Future  -     Comprehensive Metabolic Panel; Future  Chronic gout without tophus, unspecified cause, unspecified site  Overactive bladder  Gastroesophageal reflux disease without esophagitis  Vitamin D deficiency  Hypertension, unspecified type  Mixed hyperlipidemia  Gout, unspecified cause, unspecified chronicity, unspecified site  Osteoarthritis, unspecified osteoarthritis type, unspecified site  Depression, unspecified depression type  Benign prostatic hyperplasia, unspecified whether lower urinary tract symptoms present  Hyperglycemia  -     Hemoglobin A1C; Future  Other orders  -     Follow Up In Primary Care - Medicare Annual  -     Follow Up In Primary Care - Established; Future  -     Follow Up In Primary Care - Established; Future  We completed the medicare wellness exam today.  The lifestyle is reviewed and recommendations for diet and exercise are made. We reviewed the immunizations and cancer screening and recommendations for needed immunizations and screenings are made.  The patient is independent in all ADL, shows no clinical signs of cognitive impairment, and is not falling or at risk for such.     Also sees Dr. Zarate    We considered his current annual wellness visit elements:  1.  Advanced directives his daughter Genesis is his power of  but the forms are not in the chart I recommended he bring them in  2.  Cancer screenings-nothing relevant at his age  3.  Immunizations he has never had Shingrix nor RSV vaccine and I recommended both of those to him.  On the good side he had Tdap in 2016 and  Pneumovax in 2024.    We also were able to evaluate and manage his chronic medical problems.  As part of that assessment we reviewed the blood work that had recently been done:  1.  Hypertension-blood pressure is stable we will continue current program  2.  Hyperlipidemia his blood count/cholesterol did go up since we stopped the statin.  I still do not think we will restart this at this point.  3.  Overactive bladder-stable  4.  BPH-stable  5.  History of hemolytic anemia-no evidence of recurrence  6.  History of insomnia-doing well with the trazodone  7.  Diabetes-her numbers look very good.    Overall we are not making any changes today and I will see him back in 3 months  ADVANCED CARE PLANNING  Advanced Care Planning was discussed with patient:  The patient does not have an advanced care plan on file. The patient does not have an active surrogate decision-maker on file.  Encouraged the patient to confirm that Living Will and Healthcare Power of  (HCPoA) are accurate and up to date.  Encouraged the patient to confirm that our office be provided a copy of any documentation in the event that anything changes.    ACTIVITIES OF DAILY LIVING  Basic ADLs:  Bathing: Independent, Dressing: Independent, Toileting: Independent, Transferring: Independent, Continence: Independent, Feeding: Independent.    Instrumental ADLs:  Ability to use phone: Independent, Shopping: Independent, Cooking: Independent, House-keeping: Independent, Laundry: Independent, Transportation: Independent, Medication Management: Independent, Finance Management: Independent.    Subjective   This is an 89-year-old male who is here for his 2-month follow-up appointment as well as his annual wellness visit.  The patient for the most part feels very similar to previously other than he has been more fatigued than before.  We had stopped his statin at his last visit hoping that his fatigue would improve but it has not.  He finds that he cannot walk  quite as far as previously and that his stamina is somewhat down he does have an appointment coming up with his cardiologist.      Review of Systems   Constitutional:  Positive for fatigue. Negative for activity change.   HENT: Negative.     Eyes: Negative.    Respiratory: Negative.     Cardiovascular: Negative.    Gastrointestinal: Negative.    Endocrine: Negative.    Genitourinary: Negative.    Musculoskeletal:  Positive for arthralgias and back pain.   Skin: Negative.    Allergic/Immunologic: Negative.    Neurological: Negative.    Hematological: Negative.    Psychiatric/Behavioral: Negative.          Sometimes anxious   All other systems reviewed and are negative.    Objective   Vitals:    07/21/25 1630   BP: 138/86   Pulse: 72   Temp:    SpO2:       Body mass index is 26.58 kg/m².  Physical Exam  Vitals and nursing note reviewed.   Constitutional:       General: He is not in acute distress.     Appearance: Normal appearance. He is not ill-appearing.   HENT:      Head: Normocephalic and atraumatic.      Right Ear: Hearing, tympanic membrane, ear canal and external ear normal. There is no impacted cerumen.      Left Ear: Hearing, tympanic membrane, ear canal and external ear normal. There is no impacted cerumen.      Ears:      Comments: Hearing ok with hearing aids     Nose: Nose normal.      Mouth/Throat:      Mouth: Mucous membranes are moist.      Pharynx: Oropharynx is clear. No oropharyngeal exudate or posterior oropharyngeal erythema.     Eyes:      General: No scleral icterus.        Right eye: No discharge.         Left eye: No discharge.      Extraocular Movements: Extraocular movements intact.      Conjunctiva/sclera: Conjunctivae normal.      Pupils: Pupils are equal, round, and reactive to light.     Neck:      Vascular: No carotid bruit.     Cardiovascular:      Rate and Rhythm: Normal rate and regular rhythm.      Pulses: Normal pulses.      Heart sounds: Murmur heard.      No friction rub. No  "gallop.      Comments: 2/6 syst murmur  Pulmonary:      Effort: Pulmonary effort is normal. No respiratory distress.      Breath sounds: Normal breath sounds.   Abdominal:      General: Abdomen is flat. Bowel sounds are normal.      Palpations: Abdomen is soft.      Tenderness: There is no abdominal tenderness.      Hernia: No hernia is present.     Musculoskeletal:         General: No swelling. Normal range of motion.      Cervical back: Normal range of motion.   Lymphadenopathy:      Cervical: No cervical adenopathy.     Skin:     General: Skin is warm and dry.      Coloration: Skin is not jaundiced.      Findings: No rash.     Neurological:      General: No focal deficit present.      Mental Status: He is alert and oriented to person, place, and time. Mental status is at baseline.     Psychiatric:         Mood and Affect: Mood normal.         Behavior: Behavior normal.       Diagnostic Results   Lab Results   Component Value Date    GLUCOSE 278 (H) 07/17/2025    CALCIUM 9.6 07/17/2025     07/17/2025    K 3.9 07/17/2025    CO2 25 07/17/2025    CL 99 07/17/2025    BUN 19 07/17/2025    CREATININE 1.04 07/17/2025     Lab Results   Component Value Date    ALT 17 07/17/2025    AST 15 07/17/2025    ALKPHOS 61 07/17/2025    BILITOT 0.8 07/17/2025     Lab Results   Component Value Date    WBC 8.2 07/17/2025    HGB 16.9 07/17/2025    HCT 50.1 (H) 07/17/2025    MCV 96.5 07/17/2025     07/17/2025     Lab Results   Component Value Date    CHOL 229 (H) 07/17/2025    CHOL 165 01/24/2025    CHOL 210 (H) 06/17/2024     Lab Results   Component Value Date    HDL 36 (L) 07/17/2025    HDL 30.7 01/24/2025    HDL 35.0 (L) 06/17/2024     Lab Results   Component Value Date    LDLCALC 144 (H) 07/17/2025    LDLCALC 98 01/24/2025    LDLCALC 124 06/17/2024     Lab Results   Component Value Date    TRIG 321 (H) 07/17/2025    TRIG 184 (H) 01/24/2025    TRIG 254 (H) 06/17/2024     No components found for: \"CHOLHDL\"  Lab Results "   Component Value Date    HGBA1C 6.4 (H) 07/17/2025     Other labs not included in the list above reviewed either before or during this encounter.    History   Medical History[1]  Surgical History[2]  Family History[3]  Social History     Socioeconomic History    Marital status:      Spouse name: Not on file    Number of children: Not on file    Years of education: Not on file    Highest education level: Not on file   Occupational History    Not on file   Tobacco Use    Smoking status: Former     Types: Cigarettes     Passive exposure: Past    Smokeless tobacco: Never   Vaping Use    Vaping status: Never Used   Substance and Sexual Activity    Alcohol use: Yes     Comment: occasional    Drug use: Never    Sexual activity: Not on file   Other Topics Concern    Not on file   Social History Narrative    Not on file     Social Drivers of Health     Financial Resource Strain: Not on file   Food Insecurity: Not on file   Transportation Needs: Not on file   Physical Activity: Not on file   Stress: Not on file   Social Connections: Not on file   Intimate Partner Violence: Not on file   Housing Stability: Not on file     Allergies[4]  Medications Ordered Prior to Encounter[5]  Immunization History   Administered Date(s) Administered    Flu vaccine, quadrivalent, high-dose, preservative free, age 65y+ (FLUZONE) 12/03/2021, 10/19/2022    Flu vaccine, trivalent, preservative free, HIGH-DOSE, age 65y+ (Fluzone) 10/11/2024    Influenza, Unspecified 10/19/2022    Moderna SARS-CoV-2 Vaccination 04/16/2021, 05/13/2021    Pneumococcal conjugate vaccine, 13-valent (PREVNAR 13) 06/06/2016    Pneumococcal conjugate vaccine, 20-valent (PREVNAR 20) 10/11/2024    Td vaccine, age 7 years and older (TDVAX) 07/03/2003, 04/23/2013    Tdap vaccine, age 7 year and older (BOOSTRIX, ADACEL) 02/03/2016    Zoster, live 05/06/2014     Patient's medical history was reviewed and updated either before or during this encounter.     Mohan BAER  MD [facility]       [1]   Past Medical History:  Diagnosis Date    Anxiety 09/14/2023    Autoimmune hemolytic anemia 09/14/2023    Back pain of lumbar region with sciatica 09/14/2023    Benign prostatic hyperplasia 09/14/2023    Depression 09/14/2023    Gastroesophageal reflux disease without esophagitis 09/14/2023    Hematuria syndrome 09/14/2023    Hyperlipidemia 09/14/2023    Hypertension 09/14/2023    Lumbar spondylosis 09/14/2023    Neuropathy 09/14/2023    Osteoarthritis 09/14/2023    Overactive bladder 09/14/2023    Type 2 diabetes mellitus without complication, without long-term current use of insulin 09/14/2023   [2]   Past Surgical History:  Procedure Laterality Date    APPENDECTOMY  2005    COLONOSCOPY  2007    also 2010    COLONOSCOPY  2018    EYE SURGERY  2019    KNEE SURGERY Left 2018    TOTAL HIP ARTHROPLASTY Left 2006    TOTAL HIP ARTHROPLASTY Right 2010    TRIGGER FINGER RELEASE Right 2015   [3]   Family History  Problem Relation Name Age of Onset    No Known Problems Mother      No Known Problems Father      Other (High Blood Pressure) Other Family History     Cancer Other Family History    [4]   Allergies  Allergen Reactions    Sulfa (Sulfonamide Antibiotics) Other    Levofloxacin Hives and Unknown    Nitrofurantoin Monohyd/M-Cryst Itching    Atorvastatin Other    Allopurinol Rash    Amoxicillin Rash    Dexamethasone Dizziness    Gabapentin Other     Daughter states patient is allergic    Pravastatin Sodium Myalgia   [5]   Current Outpatient Medications on File Prior to Visit   Medication Sig Dispense Refill    amLODIPine (Norvasc) 5 mg tablet Take 1 tablet (5 mg) by mouth once daily.      ascorbic acid (Vitamin C) 500 mg tablet Take by mouth.      aspirin 81 mg EC tablet Take 1 tablet (81 mg) by mouth once daily. 90 tablet 3    cholecalciferol, vitamin D3, (VITAMIN D3 ORAL) Take 1 tablet by mouth once daily.      finasteride (Proscar) 5 mg tablet Take 1 tablet (5 mg) by mouth once daily. Do not  crush, chew, or split.      furosemide (Lasix) 20 mg tablet Take 1 tablet (20 mg) by mouth once daily. 90 tablet 3    lisinopril 20 mg tablet Take 1 tablet (20 mg) by mouth once daily. 180 tablet 3    magnesium oxide-Mg AA chelate (Magnesium, oxide/AA chelate,) 300 mg capsule Take 1 capsule (300 mg) by mouth once daily.      metoprolol succinate XL (Toprol-XL) 25 mg 24 hr tablet Take 1 tablet (25 mg) by mouth once daily. Do not crush or chew. 90 tablet 3    mirabegron (Myrbetriq) 50 mg tablet extended release 24 hr 24 hr tablet Take 1 tablet (50 mg) by mouth once daily.      NON FORMULARY Lithsone ( Brain Vitamin)      tamsulosin (Flomax) 0.4 mg 24 hr capsule Take 1 capsule (0.4 mg) by mouth. AT BEDTIME      traZODone (Desyrel) 50 mg tablet TAKE 1 TABLET BY MOUTH ONCE  DAILY AT BEDTIME 90 tablet 3    zinc gluconate 50 mg tablet Take 1 tablet by mouth once daily.       No current facility-administered medications on file prior to visit.

## 2025-07-21 NOTE — PATIENT INSTRUCTIONS
Maryjo-  We were able to do your annual wellness visit in follow-up today.  Hears a copy of some of the assessments and recommendations:  1.  Advanced directives-I know that Genesis is your POA but you should really bring those documents and we will scanned those into your record  2.  Cancer screenings-nothing pertinent at your age  3.  Immunizations-patient could benefit from Shingrix and RSV vaccine.  He will get those at a local pharmacy  4.  Hypertension-stable  5.  Hyperlipidemia-continue to stay off of medications at age 89  5.  History of hemolytic anemia-stable  6.  Overactive bladder-stable  7.  History of BPH-stable  8.  Diabetes-your numbers look very good    I will plan on seeing you back in 3 months

## 2025-07-24 PROBLEM — M41.20 IDIOPATHIC SCOLIOSIS AND KYPHOSCOLIOSIS: Status: ACTIVE | Noted: 2025-07-24

## 2025-07-24 PROBLEM — S33.5XXA LUMBAR SPRAIN: Status: ACTIVE | Noted: 2025-07-24

## 2025-07-24 PROBLEM — M43.10 ACQUIRED SPONDYLOLISTHESIS: Status: ACTIVE | Noted: 2025-07-24

## 2025-07-24 PROBLEM — M47.817 LUMBOSACRAL SPONDYLOSIS WITHOUT MYELOPATHY: Status: ACTIVE | Noted: 2025-07-24

## 2025-07-24 PROBLEM — M51.26 DISPLACEMENT OF LUMBAR INTERVERTEBRAL DISC WITHOUT MYELOPATHY: Status: ACTIVE | Noted: 2025-07-24

## 2025-07-24 PROBLEM — M51.369 DEGENERATION OF LUMBAR INTERVERTEBRAL DISC: Status: ACTIVE | Noted: 2025-07-24

## 2025-07-24 PROBLEM — M54.16 LUMBAR RADICULITIS: Status: ACTIVE | Noted: 2025-07-24

## 2025-07-25 ENCOUNTER — OFFICE VISIT (OUTPATIENT)
Dept: CARDIOLOGY | Facility: CLINIC | Age: 89
End: 2025-07-25
Payer: MEDICARE

## 2025-07-25 VITALS
HEART RATE: 82 BPM | HEIGHT: 72 IN | WEIGHT: 196.1 LBS | BODY MASS INDEX: 26.56 KG/M2 | DIASTOLIC BLOOD PRESSURE: 81 MMHG | OXYGEN SATURATION: 93 % | SYSTOLIC BLOOD PRESSURE: 150 MMHG

## 2025-07-25 DIAGNOSIS — I10 HYPERTENSION, UNSPECIFIED TYPE: ICD-10-CM

## 2025-07-25 DIAGNOSIS — R01.1 MURMUR, CARDIAC: Primary | ICD-10-CM

## 2025-07-25 PROCEDURE — 99212 OFFICE O/P EST SF 10 MIN: CPT

## 2025-07-25 PROCEDURE — 1160F RVW MEDS BY RX/DR IN RCRD: CPT | Performed by: NURSE PRACTITIONER

## 2025-07-25 PROCEDURE — 1126F AMNT PAIN NOTED NONE PRSNT: CPT | Performed by: NURSE PRACTITIONER

## 2025-07-25 PROCEDURE — G2211 COMPLEX E/M VISIT ADD ON: HCPCS | Performed by: NURSE PRACTITIONER

## 2025-07-25 PROCEDURE — 3079F DIAST BP 80-89 MM HG: CPT | Performed by: NURSE PRACTITIONER

## 2025-07-25 PROCEDURE — 1159F MED LIST DOCD IN RCRD: CPT | Performed by: NURSE PRACTITIONER

## 2025-07-25 PROCEDURE — 3077F SYST BP >= 140 MM HG: CPT | Performed by: NURSE PRACTITIONER

## 2025-07-25 PROCEDURE — 99214 OFFICE O/P EST MOD 30 MIN: CPT | Performed by: NURSE PRACTITIONER

## 2025-07-25 ASSESSMENT — ENCOUNTER SYMPTOMS
DEPRESSION: 0
CONSTITUTIONAL NEGATIVE: 1
MUSCULOSKELETAL NEGATIVE: 1
NEUROLOGICAL NEGATIVE: 1
OCCASIONAL FEELINGS OF UNSTEADINESS: 0
GASTROINTESTINAL NEGATIVE: 1
LOSS OF SENSATION IN FEET: 0
RESPIRATORY NEGATIVE: 1
CARDIOVASCULAR NEGATIVE: 1

## 2025-07-25 ASSESSMENT — PAIN SCALES - GENERAL: PAINLEVEL_OUTOF10: 0-NO PAIN

## 2025-07-25 NOTE — PROGRESS NOTES
Chief Complaint:   Follow-up valve    History Of Present Illness:    .Mr Lewis returns in follow-up with his daughter.Denies chest pain, sob, palpitations or pedal edema.  He has noticed more CAST since been told his valve was moderately to severely leaking.  Per Dr. Zarate patient is stable.         Last Recorded Vitals:  Blood pressure 150/81, pulse 82, height 1.829 m (6'), weight 89 kg (196 lb 1.6 oz), SpO2 93%.     Past Medical History:  Medical History[1]     Past Surgical History:  Surgical History[2]    Social History:  Social History[3]    Family History:  Family History[4]      Allergies:  Sulfa (sulfonamide antibiotics), Levofloxacin, Nitrofurantoin monohyd/m-cryst, Atorvastatin, Allopurinol, Amoxicillin, Dexamethasone, Gabapentin, and Pravastatin sodium    Outpatient Medications:  Current Medications[5]     Physical Exam:  Cardiovascular:      PMI at left midclavicular line. Normal rate. Regular rhythm. Normal S1. Normal S2.       Murmurs: There is a grade 1to 2/6 systolic murmur.      No gallop.  No click. No rub.   Pulses:     Intact distal pulses.   Edema:     Peripheral edema absent.       ROS:  Review of Systems   Constitutional: Negative.   Cardiovascular: Negative.    Respiratory: Negative.     Skin: Negative.    Musculoskeletal: Negative.    Gastrointestinal: Negative.    Genitourinary: Negative.    Neurological: Negative.           Last Labs: A1c TSH lipid panel CBC CMP reviewed from July 2025  CBC -  Lab Results   Component Value Date    WBC 8.2 07/17/2025    HGB 16.9 07/17/2025    HCT 50.1 (H) 07/17/2025    MCV 96.5 07/17/2025     07/17/2025       CMP -  Lab Results   Component Value Date    CALCIUM 9.6 07/17/2025    PROT 6.3 07/17/2025    ALBUMIN 4.3 07/17/2025    AST 15 07/17/2025    ALT 17 07/17/2025    ALKPHOS 61 07/17/2025    BILITOT 0.8 07/17/2025       LIPID PANEL -   Lab Results   Component Value Date    CHOL 229 (H) 07/17/2025    TRIG 321 (H) 07/17/2025    HDL 36 (L)  07/17/2025    CHHDL 6.4 (H) 07/17/2025    VLDL 37 01/24/2025    NHDL 193 (H) 07/17/2025       RENAL FUNCTION PANEL -   Lab Results   Component Value Date    GLUCOSE 278 (H) 07/17/2025     07/17/2025    K 3.9 07/17/2025    CL 99 07/17/2025    CO2 25 07/17/2025    ANIONGAP 13 07/17/2025    BUN 19 07/17/2025    CREATININE 1.04 07/17/2025    GFRMALE CANCELED 03/21/2023    CALCIUM 9.6 07/17/2025    ALBUMIN 4.3 07/17/2025        Lab Results   Component Value Date    HGBA1C 6.4 (H) 07/17/2025         Assessment/Plan   Problem List Items Addressed This Visit    None      1.  Hypertension.  The patient has longstanding hypertension currently is upper 80s and age.  Patient currently on relatively high dose lisinopril.  20 mg twice daily and amlodipine 5 mg twice daily.  The patient is also on hydrochlorothiazide 25 mg daily.  Blood pressure acceptable and will modify therapy by reducing the lisinopril from 20 mg twice daily to daily.  Potentially may reduce the amlodipine in the future.  He will be started on metoprolol ER 50 mg daily.  Would discontinue hydrochlorothiazide and utilize Lasix 20 mg daily because of his complaint of edema.  The patient did have an echocardiogram 10/24/2024 showing a preserved LV ejection fraction of 65% with 3+ mitral valve regurgitation.  Given the mitral valve regurgitation the patient potentially might be in need of the low-dose Lasix as well.  Clinically he had been walking 2 miles per day up to 2 months ago but now tires more easily with significantly reduced activity.  Blood pressure seems to be adequately controlled.  2.  Nondiabetic.  Lab work from 10/8/2024 included normal CBC and CMP with glycohemoglobin 5.2%.  Repeat lab from 1/24/2025 includes a glycohemoglobin of 6.0%.  Patient presently simply on dietary precautions.  Reviewed  3.  Hyperlipidemia.  Lipid panel 6/17/2024 includes cholesterol 210  HDL 35 triglyceride 254.  Given the presence of coronary artery  calcification will begin rosuvastatin 20 mg daily.  Will lab work from 1/24/2025 demonstrates an improved lipid panel cholesterol 165 LDL 98 HDL 30 triglyceride 184.  Patient evidently taken off the rosuvastatin by primary care but it will be restarted at 20 mg daily.  Additional lab work from 1/24/2025 includes a normal CBC and CMP except for glucose of 168.  The glycohemoglobin was 6.0%.  Additional labs included TSH 2.83 vitamin D 72 uric acid 7.1 magnesium 2.32.  Reviewed  I4.  Former smoking.  Patient had been a former smoker 1/2/day quit 20 years ago.  Reviewed  5.  Coronary artery disease.  This patient had a CT angiogram on 6/17/2022 negative for pulmonary embolism positive for mild emphysema and coronary artery calcification.  Given these findings he was started on aspirin 81 mg daily plus the rosuvastatin 20 mg daily.  Patient without any anginal type chest discomfort.  He does have a sore spot over his his chest.  His primary care physician reduced his dose of Toprol-XL from 50 to 25 mg daily apparently because of fatigue which will permit for now with resting heart rate 76/min.  Reviewed  6.  Status post bilateral total hip replacement reviewed  7.  Status post left total knee replacement.  Reviewed reviewed reviewed  8.  Status post appendectomy.  9.  Status post repair of undescended testicle.   10.  Moderate to severe mitral valve regurgitation.  Echo October 2024  CONCLUSIONS:   1. The left ventricular systolic function is normal, with a visually estimated ejection fraction of 65%.   2. Spectral Doppler shows an impaired relaxation pattern of left ventricular diastolic filling.   3. There is normal right ventricular global systolic function.   4. Moderate to severe mitral valve regurgitation.   5. The inferior vena cava appears mildly dilated.       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    Back pain  of lumbar region with sciatica 09/14/2023    Benign prostatic hyperplasia 09/14/2023    Depression 09/14/2023    Gastroesophageal reflux disease without esophagitis 09/14/2023    Hematuria syndrome 09/14/2023    Hyperlipidemia 09/14/2023    Hypertension 09/14/2023    Lumbar spondylosis 09/14/2023    Neuropathy 09/14/2023    Osteoarthritis 09/14/2023    Overactive bladder 09/14/2023    Type 2 diabetes mellitus without complication, without long-term current use of insulin 09/14/2023   [2]   Past Surgical History:  Procedure Laterality Date    APPENDECTOMY  2005    COLONOSCOPY  2007    also 2010    COLONOSCOPY  2018    EYE SURGERY  2019    KNEE SURGERY Left 2018    TOTAL HIP ARTHROPLASTY Left 2006    TOTAL HIP ARTHROPLASTY Right 2010    TRIGGER FINGER RELEASE Right 2015   [3]   Social History  Socioeconomic History    Marital status:    Tobacco Use    Smoking status: Former     Types: Cigarettes     Passive exposure: Past    Smokeless tobacco: Never   Vaping Use    Vaping status: Never Used   Substance and Sexual Activity    Alcohol use: Yes     Comment: occasional    Drug use: Never   [4]   Family History  Problem Relation Name Age of Onset    No Known Problems Mother      No Known Problems Father      Other (High Blood Pressure) Other Family History     Cancer Other Family History    [5]   Current Outpatient Medications   Medication Sig Dispense Refill    amLODIPine (Norvasc) 5 mg tablet Take 1 tablet (5 mg) by mouth once daily.      ascorbic acid (Vitamin C) 500 mg tablet Take by mouth.      aspirin 81 mg EC tablet Take 1 tablet (81 mg) by mouth once daily. 90 tablet 3    cholecalciferol, vitamin D3, (VITAMIN D3 ORAL) Take 1 tablet by mouth once daily.      finasteride (Proscar) 5 mg tablet Take 1 tablet (5 mg) by mouth once daily. Do not crush, chew, or split.      furosemide (Lasix) 20 mg tablet Take 1 tablet (20 mg) by mouth once daily. 90 tablet 3    lisinopril 20 mg tablet Take 1 tablet (20 mg) by mouth  once daily. 180 tablet 3    magnesium oxide-Mg AA chelate (Magnesium, oxide/AA chelate,) 300 mg capsule Take 1 capsule (300 mg) by mouth once daily.      metoprolol succinate XL (Toprol-XL) 25 mg 24 hr tablet Take 1 tablet (25 mg) by mouth once daily. Do not crush or chew. 90 tablet 3    mirabegron (Myrbetriq) 50 mg tablet extended release 24 hr 24 hr tablet Take 1 tablet (50 mg) by mouth once daily.      NON FORMULARY Lithsone ( Brain Vitamin)      tamsulosin (Flomax) 0.4 mg 24 hr capsule Take 1 capsule (0.4 mg) by mouth. AT BEDTIME      traZODone (Desyrel) 50 mg tablet TAKE 1 TABLET BY MOUTH ONCE  DAILY AT BEDTIME 90 tablet 3    zinc gluconate 50 mg tablet Take 1 tablet by mouth once daily.       No current facility-administered medications for this visit.

## 2025-08-01 ENCOUNTER — APPOINTMENT (OUTPATIENT)
Dept: PRIMARY CARE | Facility: CLINIC | Age: 89
End: 2025-08-01
Payer: MEDICARE

## 2025-08-18 DIAGNOSIS — I10 HYPERTENSION, UNSPECIFIED TYPE: ICD-10-CM

## 2025-08-18 RX ORDER — AMLODIPINE BESYLATE 5 MG/1
5 TABLET ORAL 2 TIMES DAILY
Qty: 180 TABLET | Refills: 3 | Status: SHIPPED | OUTPATIENT
Start: 2025-08-18

## 2025-11-03 ENCOUNTER — APPOINTMENT (OUTPATIENT)
Dept: PRIMARY CARE | Facility: CLINIC | Age: 89
End: 2025-11-03
Payer: MEDICARE